# Patient Record
Sex: MALE | Race: WHITE | NOT HISPANIC OR LATINO | Employment: OTHER | URBAN - METROPOLITAN AREA
[De-identification: names, ages, dates, MRNs, and addresses within clinical notes are randomized per-mention and may not be internally consistent; named-entity substitution may affect disease eponyms.]

---

## 2017-01-13 ENCOUNTER — OFFICE VISIT - HEALTHEAST (OUTPATIENT)
Dept: INTERNAL MEDICINE | Facility: CLINIC | Age: 57
End: 2017-01-13

## 2017-01-13 DIAGNOSIS — Z00.00 ROUTINE GENERAL MEDICAL EXAMINATION AT A HEALTH CARE FACILITY: ICD-10-CM

## 2017-01-13 DIAGNOSIS — N40.0 BPH (BENIGN PROSTATIC HYPERPLASIA): ICD-10-CM

## 2017-01-13 DIAGNOSIS — R53.83 FATIGUE: ICD-10-CM

## 2017-01-13 DIAGNOSIS — E78.00 HYPERCHOLESTEREMIA: ICD-10-CM

## 2017-01-13 LAB
CHOLEST SERPL-MCNC: 186 MG/DL
FASTING STATUS PATIENT QL REPORTED: YES
HDLC SERPL-MCNC: 31 MG/DL
LDLC SERPL CALC-MCNC: 139 MG/DL
PSA SERPL-MCNC: 3.6 NG/ML (ref 0–3.5)
TRIGL SERPL-MCNC: 79 MG/DL

## 2017-01-13 ASSESSMENT — MIFFLIN-ST. JEOR: SCORE: 1728.18

## 2017-01-16 ENCOUNTER — COMMUNICATION - HEALTHEAST (OUTPATIENT)
Dept: INTERNAL MEDICINE | Facility: CLINIC | Age: 57
End: 2017-01-16

## 2017-01-16 ENCOUNTER — AMBULATORY - HEALTHEAST (OUTPATIENT)
Dept: INTERNAL MEDICINE | Facility: CLINIC | Age: 57
End: 2017-01-16

## 2017-01-16 DIAGNOSIS — R93.89 ABNORMAL CHEST X-RAY: ICD-10-CM

## 2017-01-17 ENCOUNTER — COMMUNICATION - HEALTHEAST (OUTPATIENT)
Dept: ADMINISTRATIVE | Facility: CLINIC | Age: 57
End: 2017-01-17

## 2017-01-20 ENCOUNTER — COMMUNICATION - HEALTHEAST (OUTPATIENT)
Dept: INTERNAL MEDICINE | Facility: CLINIC | Age: 57
End: 2017-01-20

## 2017-01-20 ENCOUNTER — HOSPITAL ENCOUNTER (OUTPATIENT)
Dept: CT IMAGING | Facility: CLINIC | Age: 57
Discharge: HOME | End: 2017-01-20
Attending: INTERNAL MEDICINE

## 2017-01-20 DIAGNOSIS — R93.89 ABNORMAL CHEST X-RAY: ICD-10-CM

## 2018-01-18 ENCOUNTER — COMMUNICATION - HEALTHEAST (OUTPATIENT)
Dept: INTERNAL MEDICINE | Facility: CLINIC | Age: 58
End: 2018-01-18

## 2018-01-19 ENCOUNTER — OFFICE VISIT - HEALTHEAST (OUTPATIENT)
Dept: INTERNAL MEDICINE | Facility: CLINIC | Age: 58
End: 2018-01-19

## 2018-01-19 DIAGNOSIS — R53.83 FATIGUE: ICD-10-CM

## 2018-01-19 DIAGNOSIS — M25.50 ARTHRALGIA: ICD-10-CM

## 2018-01-19 DIAGNOSIS — E78.00 HYPERCHOLESTEREMIA: ICD-10-CM

## 2018-01-19 DIAGNOSIS — M19.90 OSTEOARTHRITIS: ICD-10-CM

## 2018-01-19 DIAGNOSIS — N40.0 BPH (BENIGN PROSTATIC HYPERPLASIA): ICD-10-CM

## 2018-01-19 DIAGNOSIS — Z00.00 ROUTINE GENERAL MEDICAL EXAMINATION AT A HEALTH CARE FACILITY: ICD-10-CM

## 2018-01-19 LAB
ALBUMIN SERPL-MCNC: 3.5 G/DL (ref 3.5–5)
ALBUMIN UR-MCNC: NEGATIVE MG/DL
ALP SERPL-CCNC: 83 U/L (ref 45–120)
ALT SERPL W P-5'-P-CCNC: 22 U/L (ref 0–45)
ANION GAP SERPL CALCULATED.3IONS-SCNC: 8 MMOL/L (ref 5–18)
APPEARANCE UR: CLEAR
AST SERPL W P-5'-P-CCNC: 20 U/L (ref 0–40)
BILIRUB SERPL-MCNC: 1 MG/DL (ref 0–1)
BILIRUB UR QL STRIP: NEGATIVE
BUN SERPL-MCNC: 12 MG/DL (ref 8–22)
CALCIUM SERPL-MCNC: 9.1 MG/DL (ref 8.5–10.5)
CHLORIDE BLD-SCNC: 105 MMOL/L (ref 98–107)
CHOLEST SERPL-MCNC: 200 MG/DL
CO2 SERPL-SCNC: 26 MMOL/L (ref 22–31)
COLOR UR AUTO: YELLOW
CREAT SERPL-MCNC: 0.87 MG/DL (ref 0.7–1.3)
ERYTHROCYTE [DISTWIDTH] IN BLOOD BY AUTOMATED COUNT: 12 % (ref 11–14.5)
ERYTHROCYTE [SEDIMENTATION RATE] IN BLOOD BY WESTERGREN METHOD: 2 MM/HR (ref 0–15)
FASTING STATUS PATIENT QL REPORTED: YES
GFR SERPL CREATININE-BSD FRML MDRD: >60 ML/MIN/1.73M2
GLUCOSE BLD-MCNC: 93 MG/DL (ref 70–125)
GLUCOSE UR STRIP-MCNC: NEGATIVE MG/DL
HCT VFR BLD AUTO: 45.6 % (ref 40–54)
HCV AB SERPL QL IA: NEGATIVE
HDLC SERPL-MCNC: 36 MG/DL
HGB BLD-MCNC: 15.3 G/DL (ref 14–18)
HGB UR QL STRIP: NEGATIVE
KETONES UR STRIP-MCNC: NEGATIVE MG/DL
LDLC SERPL CALC-MCNC: 147 MG/DL
LEUKOCYTE ESTERASE UR QL STRIP: NEGATIVE
MCH RBC QN AUTO: 29.4 PG (ref 27–34)
MCHC RBC AUTO-ENTMCNC: 33.5 G/DL (ref 32–36)
MCV RBC AUTO: 88 FL (ref 80–100)
NITRATE UR QL: NEGATIVE
PH UR STRIP: 6 [PH] (ref 5–8)
PLATELET # BLD AUTO: 197 THOU/UL (ref 140–440)
PMV BLD AUTO: 7.6 FL (ref 7–10)
POTASSIUM BLD-SCNC: 4 MMOL/L (ref 3.5–5)
PROT SERPL-MCNC: 6.9 G/DL (ref 6–8)
PSA SERPL-MCNC: 3 NG/ML (ref 0–3.5)
RBC # BLD AUTO: 5.19 MILL/UL (ref 4.4–6.2)
SODIUM SERPL-SCNC: 139 MMOL/L (ref 136–145)
SP GR UR STRIP: <=1.005 (ref 1–1.03)
TRIGL SERPL-MCNC: 86 MG/DL
UROBILINOGEN UR STRIP-ACNC: NORMAL
WBC: 5.4 THOU/UL (ref 4–11)

## 2018-01-19 ASSESSMENT — MIFFLIN-ST. JEOR: SCORE: 1745.19

## 2018-01-22 ENCOUNTER — AMBULATORY - HEALTHEAST (OUTPATIENT)
Dept: INTERNAL MEDICINE | Facility: CLINIC | Age: 58
End: 2018-01-22

## 2018-01-22 DIAGNOSIS — R53.83 FATIGUE: ICD-10-CM

## 2018-01-22 DIAGNOSIS — E78.00 HYPERCHOLESTEREMIA: ICD-10-CM

## 2018-01-22 LAB — B BURGDOR IGG+IGM SER QL: 0.55 INDEX VALUE

## 2018-01-31 ENCOUNTER — COMMUNICATION - HEALTHEAST (OUTPATIENT)
Dept: INTERNAL MEDICINE | Facility: CLINIC | Age: 58
End: 2018-01-31

## 2018-03-02 ENCOUNTER — COMMUNICATION - HEALTHEAST (OUTPATIENT)
Dept: INTERNAL MEDICINE | Facility: CLINIC | Age: 58
End: 2018-03-02

## 2018-03-05 ENCOUNTER — OFFICE VISIT - HEALTHEAST (OUTPATIENT)
Dept: INTERNAL MEDICINE | Facility: CLINIC | Age: 58
End: 2018-03-05

## 2018-03-05 DIAGNOSIS — S60.552D: ICD-10-CM

## 2018-03-05 DIAGNOSIS — Z01.818 ENCOUNTER FOR PREOPERATIVE EXAMINATION FOR GENERAL SURGICAL PROCEDURE: ICD-10-CM

## 2018-03-05 LAB
ATRIAL RATE - MUSE: 62 BPM
DIASTOLIC BLOOD PRESSURE - MUSE: NORMAL MMHG
INTERPRETATION ECG - MUSE: NORMAL
P AXIS - MUSE: 44 DEGREES
PR INTERVAL - MUSE: 160 MS
QRS DURATION - MUSE: 100 MS
QT - MUSE: 424 MS
QTC - MUSE: 430 MS
R AXIS - MUSE: -14 DEGREES
SYSTOLIC BLOOD PRESSURE - MUSE: NORMAL MMHG
T AXIS - MUSE: 0 DEGREES
VENTRICULAR RATE- MUSE: 62 BPM

## 2018-03-05 ASSESSMENT — MIFFLIN-ST. JEOR: SCORE: 1772.41

## 2018-03-27 ENCOUNTER — RECORDS - HEALTHEAST (OUTPATIENT)
Dept: LAB | Facility: CLINIC | Age: 58
End: 2018-03-27

## 2018-03-28 ENCOUNTER — RECORDS - HEALTHEAST (OUTPATIENT)
Dept: ADMINISTRATIVE | Facility: OTHER | Age: 58
End: 2018-03-28

## 2018-03-28 LAB
LAB AP CHARGES (HE HISTORICAL CONVERSION): NORMAL
PATH REPORT.COMMENTS IMP SPEC: NORMAL
PATH REPORT.COMMENTS IMP SPEC: NORMAL
PATH REPORT.FINAL DX SPEC: NORMAL
PATH REPORT.GROSS SPEC: NORMAL
PATH REPORT.MICROSCOPIC SPEC OTHER STN: NORMAL
PATH REPORT.RELEVANT HX SPEC: NORMAL
RESULT FLAG (HE HISTORICAL CONVERSION): NORMAL

## 2018-03-30 LAB
BACTERIA SPEC CULT: NO GROWTH
BACTERIA SPEC CULT: NORMAL
GRAM STAIN RESULT: NORMAL
GRAM STAIN RESULT: NORMAL

## 2018-04-11 ENCOUNTER — RECORDS - HEALTHEAST (OUTPATIENT)
Dept: ADMINISTRATIVE | Facility: OTHER | Age: 58
End: 2018-04-11

## 2018-04-18 ENCOUNTER — RECORDS - HEALTHEAST (OUTPATIENT)
Dept: ADMINISTRATIVE | Facility: OTHER | Age: 58
End: 2018-04-18

## 2018-05-09 LAB — BACTERIA SPEC CULT: NORMAL

## 2018-05-14 ENCOUNTER — RECORDS - HEALTHEAST (OUTPATIENT)
Dept: ADMINISTRATIVE | Facility: OTHER | Age: 58
End: 2018-05-14

## 2019-01-25 ENCOUNTER — AMBULATORY - HEALTHEAST (OUTPATIENT)
Dept: INTERNAL MEDICINE | Facility: CLINIC | Age: 59
End: 2019-01-25

## 2019-01-25 ENCOUNTER — OFFICE VISIT - HEALTHEAST (OUTPATIENT)
Dept: INTERNAL MEDICINE | Facility: CLINIC | Age: 59
End: 2019-01-25

## 2019-01-25 DIAGNOSIS — Z00.00 ROUTINE GENERAL MEDICAL EXAMINATION AT A HEALTH CARE FACILITY: ICD-10-CM

## 2019-01-25 DIAGNOSIS — R35.1 BENIGN PROSTATIC HYPERPLASIA WITH NOCTURIA: ICD-10-CM

## 2019-01-25 DIAGNOSIS — M15.0 PRIMARY OSTEOARTHRITIS INVOLVING MULTIPLE JOINTS: ICD-10-CM

## 2019-01-25 DIAGNOSIS — Z12.11 SCREENING FOR COLON CANCER: ICD-10-CM

## 2019-01-25 DIAGNOSIS — E78.00 HYPERCHOLESTEREMIA: ICD-10-CM

## 2019-01-25 DIAGNOSIS — Z12.5 ENCOUNTER FOR PROSTATE CANCER SCREENING: ICD-10-CM

## 2019-01-25 DIAGNOSIS — Z86.0100 PERSONAL HISTORY OF COLONIC POLYPS: ICD-10-CM

## 2019-01-25 DIAGNOSIS — N40.1 BENIGN PROSTATIC HYPERPLASIA WITH NOCTURIA: ICD-10-CM

## 2019-01-25 DIAGNOSIS — S60.552D: ICD-10-CM

## 2019-01-25 DIAGNOSIS — Z23 NEED FOR IMMUNIZATION AGAINST INFLUENZA: ICD-10-CM

## 2019-01-25 LAB
ALBUMIN SERPL-MCNC: 3.8 G/DL (ref 3.5–5)
ALBUMIN UR-MCNC: NEGATIVE MG/DL
ALP SERPL-CCNC: 77 U/L (ref 45–120)
ALT SERPL W P-5'-P-CCNC: 27 U/L (ref 0–45)
ANION GAP SERPL CALCULATED.3IONS-SCNC: 10 MMOL/L (ref 5–18)
APPEARANCE UR: CLEAR
AST SERPL W P-5'-P-CCNC: 25 U/L (ref 0–40)
BILIRUB SERPL-MCNC: 0.7 MG/DL (ref 0–1)
BILIRUB UR QL STRIP: NEGATIVE
BUN SERPL-MCNC: 13 MG/DL (ref 8–22)
CALCIUM SERPL-MCNC: 9.1 MG/DL (ref 8.5–10.5)
CHLORIDE BLD-SCNC: 105 MMOL/L (ref 98–107)
CHOLEST SERPL-MCNC: 190 MG/DL
CO2 SERPL-SCNC: 24 MMOL/L (ref 22–31)
COLOR UR AUTO: NORMAL
CREAT SERPL-MCNC: 0.86 MG/DL (ref 0.7–1.3)
FASTING STATUS PATIENT QL REPORTED: ABNORMAL
GFR SERPL CREATININE-BSD FRML MDRD: >60 ML/MIN/1.73M2
GLUCOSE BLD-MCNC: 85 MG/DL (ref 70–125)
GLUCOSE UR STRIP-MCNC: NEGATIVE MG/DL
HDLC SERPL-MCNC: 35 MG/DL
HGB BLD-MCNC: 15.4 G/DL (ref 14–18)
HGB UR QL STRIP: NEGATIVE
KETONES UR STRIP-MCNC: NEGATIVE MG/DL
LDLC SERPL CALC-MCNC: 140 MG/DL
LEUKOCYTE ESTERASE UR QL STRIP: NEGATIVE
NITRATE UR QL: NEGATIVE
PH UR STRIP: 6 [PH] (ref 4.5–8)
POTASSIUM BLD-SCNC: 4.6 MMOL/L (ref 3.5–5)
PROT SERPL-MCNC: 6.4 G/DL (ref 6–8)
PSA SERPL-MCNC: 2.4 NG/ML (ref 0–4.5)
SODIUM SERPL-SCNC: 139 MMOL/L (ref 136–145)
SP GR UR STRIP: 1 (ref 1–1.03)
TRIGL SERPL-MCNC: 73 MG/DL
UROBILINOGEN UR STRIP-ACNC: NORMAL

## 2019-01-25 ASSESSMENT — MIFFLIN-ST. JEOR: SCORE: 1731.59

## 2019-02-05 ENCOUNTER — RECORDS - HEALTHEAST (OUTPATIENT)
Dept: ADMINISTRATIVE | Facility: OTHER | Age: 59
End: 2019-02-05

## 2019-02-08 ENCOUNTER — AMBULATORY - HEALTHEAST (OUTPATIENT)
Dept: INTERNAL MEDICINE | Facility: CLINIC | Age: 59
End: 2019-02-08

## 2019-02-08 DIAGNOSIS — Z51.81 MEDICATION MONITORING ENCOUNTER: ICD-10-CM

## 2019-02-08 DIAGNOSIS — E78.00 HYPERCHOLESTEREMIA: ICD-10-CM

## 2019-02-09 ENCOUNTER — AMBULATORY - HEALTHEAST (OUTPATIENT)
Dept: INTERNAL MEDICINE | Facility: CLINIC | Age: 59
End: 2019-02-09

## 2019-02-22 ENCOUNTER — COMMUNICATION - HEALTHEAST (OUTPATIENT)
Dept: INTERNAL MEDICINE | Facility: CLINIC | Age: 59
End: 2019-02-22

## 2019-02-26 ENCOUNTER — COMMUNICATION - HEALTHEAST (OUTPATIENT)
Dept: INTERNAL MEDICINE | Facility: CLINIC | Age: 59
End: 2019-02-26

## 2019-03-14 ENCOUNTER — AMBULATORY - HEALTHEAST (OUTPATIENT)
Dept: INTERNAL MEDICINE | Facility: CLINIC | Age: 59
End: 2019-03-14

## 2019-03-15 ENCOUNTER — RECORDS - HEALTHEAST (OUTPATIENT)
Dept: ADMINISTRATIVE | Facility: OTHER | Age: 59
End: 2019-03-15

## 2020-01-31 ENCOUNTER — OFFICE VISIT - HEALTHEAST (OUTPATIENT)
Dept: INTERNAL MEDICINE | Facility: CLINIC | Age: 60
End: 2020-01-31

## 2020-01-31 DIAGNOSIS — Z12.5 ENCOUNTER FOR PROSTATE CANCER SCREENING: ICD-10-CM

## 2020-01-31 DIAGNOSIS — R19.8 CHANGE IN BOWEL MOVEMENT: ICD-10-CM

## 2020-01-31 DIAGNOSIS — I71.20 THORACIC AORTIC ANEURYSM WITHOUT RUPTURE (H): ICD-10-CM

## 2020-01-31 DIAGNOSIS — Z00.00 ROUTINE GENERAL MEDICAL EXAMINATION AT A HEALTH CARE FACILITY: ICD-10-CM

## 2020-01-31 DIAGNOSIS — E78.00 HYPERCHOLESTEREMIA: ICD-10-CM

## 2020-01-31 DIAGNOSIS — Z86.0100 PERSONAL HISTORY OF COLONIC POLYPS: ICD-10-CM

## 2020-01-31 DIAGNOSIS — N40.1 BENIGN PROSTATIC HYPERPLASIA WITH LOWER URINARY TRACT SYMPTOMS, SYMPTOM DETAILS UNSPECIFIED: ICD-10-CM

## 2020-01-31 DIAGNOSIS — E55.9 VITAMIN D DEFICIENCY: ICD-10-CM

## 2020-01-31 LAB
ALBUMIN SERPL-MCNC: 3.9 G/DL (ref 3.5–5)
ALBUMIN UR-MCNC: NEGATIVE MG/DL
ALP SERPL-CCNC: 89 U/L (ref 45–120)
ALT SERPL W P-5'-P-CCNC: 38 U/L (ref 0–45)
ANION GAP SERPL CALCULATED.3IONS-SCNC: 9 MMOL/L (ref 5–18)
APPEARANCE UR: CLEAR
AST SERPL W P-5'-P-CCNC: 27 U/L (ref 0–40)
BILIRUB SERPL-MCNC: 0.7 MG/DL (ref 0–1)
BILIRUB UR QL STRIP: NEGATIVE
BUN SERPL-MCNC: 9 MG/DL (ref 8–22)
CALCIUM SERPL-MCNC: 8.9 MG/DL (ref 8.5–10.5)
CHLORIDE BLD-SCNC: 107 MMOL/L (ref 98–107)
CHOLEST SERPL-MCNC: 118 MG/DL
CO2 SERPL-SCNC: 24 MMOL/L (ref 22–31)
COLOR UR AUTO: YELLOW
CREAT SERPL-MCNC: 0.85 MG/DL (ref 0.7–1.3)
ERYTHROCYTE [DISTWIDTH] IN BLOOD BY AUTOMATED COUNT: 12.7 % (ref 11–14.5)
FASTING STATUS PATIENT QL REPORTED: YES
GFR SERPL CREATININE-BSD FRML MDRD: >60 ML/MIN/1.73M2
GLUCOSE BLD-MCNC: 97 MG/DL (ref 70–125)
GLUCOSE UR STRIP-MCNC: NEGATIVE MG/DL
HCT VFR BLD AUTO: 44.9 % (ref 40–54)
HDLC SERPL-MCNC: 33 MG/DL
HGB BLD-MCNC: 15.6 G/DL (ref 14–18)
HGB UR QL STRIP: NEGATIVE
KETONES UR STRIP-MCNC: NEGATIVE MG/DL
LDLC SERPL CALC-MCNC: 69 MG/DL
LEUKOCYTE ESTERASE UR QL STRIP: NEGATIVE
MCH RBC QN AUTO: 30.7 PG (ref 27–34)
MCHC RBC AUTO-ENTMCNC: 34.6 G/DL (ref 32–36)
MCV RBC AUTO: 89 FL (ref 80–100)
NITRATE UR QL: NEGATIVE
PH UR STRIP: 5.5 [PH] (ref 4.5–8)
PLATELET # BLD AUTO: 184 THOU/UL (ref 140–440)
PMV BLD AUTO: 7.8 FL (ref 7–10)
POTASSIUM BLD-SCNC: 5.2 MMOL/L (ref 3.5–5)
PROT SERPL-MCNC: 6.6 G/DL (ref 6–8)
PSA SERPL-MCNC: 2.2 NG/ML (ref 0–4.5)
RBC # BLD AUTO: 5.07 MILL/UL (ref 4.4–6.2)
SODIUM SERPL-SCNC: 140 MMOL/L (ref 136–145)
SP GR UR STRIP: 1.01 (ref 1–1.03)
TRIGL SERPL-MCNC: 80 MG/DL
UROBILINOGEN UR STRIP-ACNC: NORMAL
WBC: 4.7 THOU/UL (ref 4–11)

## 2020-01-31 ASSESSMENT — MIFFLIN-ST. JEOR: SCORE: 1713.44

## 2020-02-07 ENCOUNTER — HOSPITAL ENCOUNTER (OUTPATIENT)
Dept: CT IMAGING | Facility: CLINIC | Age: 60
Discharge: HOME | End: 2020-02-07
Attending: INTERNAL MEDICINE

## 2020-02-07 ENCOUNTER — COMMUNICATION - HEALTHEAST (OUTPATIENT)
Dept: INTERNAL MEDICINE | Facility: CLINIC | Age: 60
End: 2020-02-07

## 2020-02-07 DIAGNOSIS — I71.20 THORACIC AORTIC ANEURYSM WITHOUT RUPTURE (H): ICD-10-CM

## 2020-03-13 ENCOUNTER — COMMUNICATION - HEALTHEAST (OUTPATIENT)
Dept: INTERNAL MEDICINE | Facility: CLINIC | Age: 60
End: 2020-03-13

## 2020-03-13 DIAGNOSIS — E78.00 HYPERCHOLESTEREMIA: ICD-10-CM

## 2020-10-17 ENCOUNTER — RECORDS - HEALTHEAST (OUTPATIENT)
Dept: ADMINISTRATIVE | Facility: OTHER | Age: 60
End: 2020-10-17

## 2021-02-12 ENCOUNTER — OFFICE VISIT - HEALTHEAST (OUTPATIENT)
Dept: INTERNAL MEDICINE | Facility: CLINIC | Age: 61
End: 2021-02-12

## 2021-02-12 DIAGNOSIS — Z00.00 ROUTINE GENERAL MEDICAL EXAMINATION AT A HEALTH CARE FACILITY: ICD-10-CM

## 2021-02-12 DIAGNOSIS — E78.00 HYPERCHOLESTEREMIA: ICD-10-CM

## 2021-02-12 DIAGNOSIS — Z86.0100 PERSONAL HISTORY OF COLONIC POLYPS: ICD-10-CM

## 2021-02-12 DIAGNOSIS — I71.20 THORACIC AORTIC ANEURYSM WITHOUT RUPTURE (H): ICD-10-CM

## 2021-02-12 DIAGNOSIS — Z12.5 ENCOUNTER FOR PROSTATE CANCER SCREENING: ICD-10-CM

## 2021-02-12 DIAGNOSIS — N40.1 BENIGN PROSTATIC HYPERPLASIA WITH URINARY FREQUENCY: ICD-10-CM

## 2021-02-12 DIAGNOSIS — R35.0 BENIGN PROSTATIC HYPERPLASIA WITH URINARY FREQUENCY: ICD-10-CM

## 2021-02-12 LAB
ALBUMIN SERPL-MCNC: 3.9 G/DL (ref 3.5–5)
ALBUMIN UR-MCNC: NEGATIVE MG/DL
ALP SERPL-CCNC: 85 U/L (ref 45–120)
ALT SERPL W P-5'-P-CCNC: 35 U/L (ref 0–45)
ANION GAP SERPL CALCULATED.3IONS-SCNC: 7 MMOL/L (ref 5–18)
APPEARANCE UR: CLEAR
AST SERPL W P-5'-P-CCNC: 25 U/L (ref 0–40)
BILIRUB SERPL-MCNC: 1 MG/DL (ref 0–1)
BILIRUB UR QL STRIP: NEGATIVE
BUN SERPL-MCNC: 10 MG/DL (ref 8–22)
CALCIUM SERPL-MCNC: 8.6 MG/DL (ref 8.5–10.5)
CHLORIDE BLD-SCNC: 107 MMOL/L (ref 98–107)
CHOLEST SERPL-MCNC: 130 MG/DL
CO2 SERPL-SCNC: 26 MMOL/L (ref 22–31)
COLOR UR AUTO: YELLOW
CREAT SERPL-MCNC: 0.88 MG/DL (ref 0.7–1.3)
ERYTHROCYTE [DISTWIDTH] IN BLOOD BY AUTOMATED COUNT: 12.4 % (ref 11–14.5)
FASTING STATUS PATIENT QL REPORTED: YES
GFR SERPL CREATININE-BSD FRML MDRD: >60 ML/MIN/1.73M2
GLUCOSE BLD-MCNC: 101 MG/DL (ref 70–125)
GLUCOSE UR STRIP-MCNC: NEGATIVE MG/DL
HCT VFR BLD AUTO: 47.1 % (ref 40–54)
HDLC SERPL-MCNC: 35 MG/DL
HGB BLD-MCNC: 15.7 G/DL (ref 14–18)
HGB UR QL STRIP: NEGATIVE
KETONES UR STRIP-MCNC: NEGATIVE MG/DL
LDLC SERPL CALC-MCNC: 82 MG/DL
LEUKOCYTE ESTERASE UR QL STRIP: NEGATIVE
MCH RBC QN AUTO: 29.7 PG (ref 27–34)
MCHC RBC AUTO-ENTMCNC: 33.3 G/DL (ref 32–36)
MCV RBC AUTO: 89 FL (ref 80–100)
NITRATE UR QL: NEGATIVE
PH UR STRIP: 7 [PH] (ref 5–8)
PLATELET # BLD AUTO: 203 THOU/UL (ref 140–440)
PMV BLD AUTO: 9.2 FL (ref 7–10)
POTASSIUM BLD-SCNC: 4.7 MMOL/L (ref 3.5–5)
PROT SERPL-MCNC: 6.6 G/DL (ref 6–8)
PSA SERPL-MCNC: 2.7 NG/ML (ref 0–4.5)
RBC # BLD AUTO: 5.28 MILL/UL (ref 4.4–6.2)
SODIUM SERPL-SCNC: 140 MMOL/L (ref 136–145)
SP GR UR STRIP: 1.01 (ref 1–1.03)
TRIGL SERPL-MCNC: 64 MG/DL
UROBILINOGEN UR STRIP-ACNC: NORMAL
WBC: 4.9 THOU/UL (ref 4–11)

## 2021-02-12 RX ORDER — ROSUVASTATIN CALCIUM 10 MG/1
TABLET, COATED ORAL
Qty: 90 TABLET | Refills: 3 | Status: SHIPPED | OUTPATIENT
Start: 2021-02-12 | End: 2022-03-10

## 2021-02-12 ASSESSMENT — MIFFLIN-ST. JEOR: SCORE: 1702.39

## 2021-04-09 ENCOUNTER — IMMUNIZATION (OUTPATIENT)
Dept: NURSING | Facility: CLINIC | Age: 61
End: 2021-04-09
Payer: COMMERCIAL

## 2021-04-09 PROCEDURE — 91300 PR COVID VAC PFIZER DIL RECON 30 MCG/0.3 ML IM: CPT

## 2021-04-09 PROCEDURE — 0001A PR COVID VAC PFIZER DIL RECON 30 MCG/0.3 ML IM: CPT

## 2021-04-30 ENCOUNTER — IMMUNIZATION (OUTPATIENT)
Dept: NURSING | Facility: CLINIC | Age: 61
End: 2021-04-30
Attending: INTERNAL MEDICINE
Payer: COMMERCIAL

## 2021-04-30 PROCEDURE — 91300 PR COVID VAC PFIZER DIL RECON 30 MCG/0.3 ML IM: CPT

## 2021-04-30 PROCEDURE — 0002A PR COVID VAC PFIZER DIL RECON 30 MCG/0.3 ML IM: CPT

## 2021-05-26 ENCOUNTER — RECORDS - HEALTHEAST (OUTPATIENT)
Dept: ADMINISTRATIVE | Facility: CLINIC | Age: 61
End: 2021-05-26

## 2021-05-30 VITALS — HEIGHT: 72 IN | BODY MASS INDEX: 26.82 KG/M2 | WEIGHT: 198 LBS

## 2021-05-31 VITALS — WEIGHT: 200 LBS | HEIGHT: 72 IN | BODY MASS INDEX: 27.09 KG/M2

## 2021-06-01 VITALS — HEIGHT: 72 IN | BODY MASS INDEX: 27.9 KG/M2 | WEIGHT: 206 LBS

## 2021-06-02 VITALS — WEIGHT: 197 LBS | HEIGHT: 72 IN | BODY MASS INDEX: 26.68 KG/M2

## 2021-06-04 VITALS
SYSTOLIC BLOOD PRESSURE: 110 MMHG | OXYGEN SATURATION: 97 % | HEIGHT: 72 IN | BODY MASS INDEX: 26.14 KG/M2 | WEIGHT: 193 LBS | HEART RATE: 61 BPM | DIASTOLIC BLOOD PRESSURE: 82 MMHG

## 2021-06-05 VITALS
SYSTOLIC BLOOD PRESSURE: 122 MMHG | HEART RATE: 56 BPM | WEIGHT: 190.56 LBS | BODY MASS INDEX: 25.81 KG/M2 | OXYGEN SATURATION: 98 % | HEIGHT: 72 IN | DIASTOLIC BLOOD PRESSURE: 81 MMHG

## 2021-06-05 NOTE — TELEPHONE ENCOUNTER
----- Message from Juvencio Carter MD sent at 2/7/2020  3:07 PM CST -----  Please call and tell him that the dilated aorta is unchanged from last year.  Still measuring 4.4 cm.

## 2021-06-05 NOTE — PATIENT INSTRUCTIONS - HE
Recommending shingles vaccine, Shingrix.  This can also be obtained at your pharmacy.    Colonoscopy will be due 2022    Try Florastor twice daily for 2 weeks for change in bowel habits    Schedule appointment with eye doctor every 1 to 2 years and dentist every 6 months

## 2021-06-08 NOTE — PROGRESS NOTES
Office Visit - Physical   Ge DEBORAH Kiran   56 y.o.  male    Date of visit: 1/13/2017  Physician: Juvencio Carter MD     Assessment and Plan   1. Routine general medical examination at a health care facility  Immunizations reviewed.  We'll provide flu shot.  He will be due for a colonoscopy at the end of this year.  He should have every 5 years with history of polyps.  Prostate exam normal and we'll check PSA for prostate cancer screening.  Former smoker quitting before age 28.  We discussed trying to get more regular exercise.  He does have a living will.  He will continue regular eye exams and does keep up with dental appointments.  We discussed using sunblock.  Family history reviewed.  - PSA (Prostatic-Specific Antigen), Annual Screen  - Urinalysis    2. Hypercholesteremia  He has had dyslipidemia with elevated LDL and low HDL.  Recheck today and again discussed the importance of regular exercise.  His diet is fairly reasonable although he does have a sweet tooth.  - Lipid Cascade    3. BPH (benign prostatic hyperplasia)  Ongoing symptoms of urgency and slower stream.  We discussed Flomax as an option but he is not interested in medication at this time.  He will continue with saw palmetto    4. Fatigue  He feels exhausted at the end of the day.  Will complete metabolic evaluation including screening for B12 deficiency and looking for thyroid disease.  Given his distant history of tobacco use, will also obtain chest x-ray although minimal symptoms.  He describes some dyspnea when he bends over to tie his shoe.  We also discussed the possibility of sleep apnea.  He may not be getting adequate restful sleep at night and does start his morning at 5 AM.  His wife who is a respiratory therapist will monitor for any worrisome symptoms.  Consider referral to sleep clinic.  - Vitamin B12  - XR Chest PA and Lateral  - Comprehensive Metabolic Panel  - Thyroid Stimulating Hormone (TSH)  - HM2(CBC w/o  Differential)    Return in about 1 year (around 1/13/2018) for Annual physical.     Chief Complaint   Chief Complaint   Patient presents with     Annual Exam        Patient Profile   Social History     Social History Narrative    Construction     , 2 Children        Past Medical History   Patient Active Problem List   Diagnosis     Allergic rhinitis     Hypercholesteremia     Personal history of colonic polyps     BPH (benign prostatic hyperplasia)     Osteoarthritis     Tendinitis, de Quervain's     Cervical radiculopathy     Fatigue       Past Surgical History  He has a past surgical history that includes Appendectomy; Tonsillectomy; and Ankle fracture surgery.     History of Present Illness   This 56 y.o. old gentleman is here for an annual physical.  He has ongoing BPH symptoms.  Some urgency during the day with slower stream.  He is using saw palmetto.  He tried Avodart for 1 year without much benefit.  He is not too interested in taking any prescription medicine.  He is having ongoing fatigue especially at the end of the day.  He gets home from work and feels exhausted.  He does awaken every morning at 5 AM.  No problems during working daytime hours.  No falling asleep during the day on work days but can nap on the weekend.  No history of sleep apnea but he is a snorer.  Symptoms have been persistent for over one year.  No exertional chest pain or dyspnea and symptoms do not bother him with exertion.     Review of Systems: A comprehensive review of systems was negative except as noted.  General: Ongoing fatigue, no unexpected weight loss or weight gain, fevers, chills, or night sweats  Eyes:   Saw ophthalmology earlier this year  ENT: No ear or sinus infections.  No change in hearing.  No tinnitus.  Respiratory: No wheezing, dyspnea on exertion, or chronic cough  Cardiovascular: No chest pain, palpitations, dizziness, or syncope.  No peripheral edema.  GI: No abdominal pain, reflux symptoms, dysphagia,  "nausea, vomiting, constipation, or diarrhea  : No change in frequency or incontinence.  No hematuria.  No erectile dysfunction  Skin: No new rashes or lesions.  Neurologic: No headaches, seizures, dizziness, weakness, or numbness.  Musculoskeletal: No muscle or joint pain.  Lymphatic: No swollen lymph nodes  Psychiatric: No depression, anxiety, or sleep disorder.       Medications and Allergies   Current Outpatient Prescriptions   Medication Sig Dispense Refill     multivitamin (ONE A DAY) per tablet Take 1 tablet by mouth daily. 120 tablet 2     saw palmetto 160 mg cap 1 daily  0     No current facility-administered medications for this visit.      No Known Allergies     Family and Social History   Family History   Problem Relation Age of Onset     Diabetes type II Brother      Dementia Brother         Social History   Substance Use Topics     Smoking status: Never Smoker     Smokeless tobacco: None      Comment: quit 23yo     Alcohol use Yes      Comment: 3 beer/ week        Physical Exam   General Appearance:   Well-appearing middle-aged male    Visit Vitals     /80 (Patient Site: Right Arm, Patient Position: Sitting, Cuff Size: Adult Large)     Pulse 64     Ht 5' 11.5\" (1.816 m)     Wt 198 lb (89.8 kg)     SpO2 98%     BMI 27.23 kg/m2       EYES: Eyelids, conjunctiva, and sclera were normal. Pupils were normal. Cornea, iris, and lens were normal bilaterally.  HEAD, EARS, NOSE, MOUTH, AND THROAT: Head and face were normal. Nose appearance was normal and there was no discharge. Oropharynx was normal.  NECK: Neck appearance was normal. There were no neck masses and the thyroid was not enlarged and no nodules are felt.  No lymphadenopathy.  RESPIRATORY: Breathing pattern was normal and the chest moved symmetrically.  Percussion/auscultatory percussion was normal.  Lung sounds were normal and there were no rales or wheezes.  CARDIOVASCULAR: Heart rate and rhythm were normal.  S1 and S2 were normal and there " were no extra sounds or murmurs. Peripheral pulses in arms and legs were normal.  Jugular venous pressure was normal.  There was no peripheral edema.  No carotid bruits.  GASTROINTESTINAL: The abdomen was normal in contour.  Bowel sounds were present.  Percussion detected no organ enlargement or tenderness.  Palpation detected no tenderness, mass, or enlarged organs.   RECTAL/PROSTATE: No external lesions.  Sphincter tone normal.  No palpable rectal lesions.  Prostate normal size, smooth, nontender without palpable lesions.  MUSCULOSKELETAL: Skeletal configuration was normal and muscle mass was normal for age. Joint appearance was overall normal.  LYMPHATIC: There were no enlarged nodes.  SKIN/HAIR/NAILS: Skin color was normal.  There were no skin lesions.  Hair and nails were normal.  NEUROLOGIC: The patient was alert and oriented to person, place, time, and circumstance. Speech was normal. Cranial nerves were normal. Motor strength was normal for age. The patient was normally coordinated.  Sensation intact.  PSYCHIATRIC:  Mood and affect were normal and the patient had normal recent and remote memory. The patient's judgment and insight were normal.       Additional Information        Juvencio Carter MD  Internal Medicine  Contact me at [phone]

## 2021-06-15 NOTE — PROGRESS NOTES
Office Visit - Physical   Ge Kiran   57 y.o.  male    Date of visit: 1/19/2018  Physician: Juvencio Carter MD     Assessment and Plan   1. Routine general medical examination at a health care facility  Immunizations are reviewed and will provide flu shot today.  Living will discussed.  Non-smoker.  Uses alcohol in moderation.  Regular exercise discussed.  He is due for a colonoscopy and will help get this scheduled.  Prostate exam is normal and I will check a PSA for prostate cancer screening.   He sees his ophthalmologist regularly and gets glaucoma screening.  Skin exam performed and recommending regular use of sunblock.  Hepatitis C antibody for screening.  Will screen for diabetes with fasting glucose.     - Urinalysis  - PSA, Annual Screen (Prostatic-Specific Antigen)  - Hepatitis C Antibody (Anti-HCV)  - Ambulatory referral for Colonoscopy    2. Fatigue  Ongoing fatigue.  So for metabolic evaluation unremarkable but will check appropriate labs.  We will also screen for Lyme disease given associated joint aches.  Testosterone was normal 2 years ago.  B12 level normal.  No depression.  I suspect this is related to inadequate/poor sleep.  May have undiagnosed sleep apnea or periodic limb movement disorder.  Consider referral to sleep clinic.  His wife who is a respiratory therapist is in agreement.  - Comprehensive Metabolic Panel  - HM2(CBC w/o Differential)    3. Hypercholesteremia  Recheck lipid profile.  Cholesterol has been elevated  - Lipid Cascade    4. Osteoarthritis  Pain involving multiple joints with morning stiffness.    5. BPH (benign prostatic hyperplasia)  Symptoms are stable    6. Arthralgia  Suspect related to osteoarthritis but will check for chronic Lyme and will obtain sed rate  - Lyme Antibody Cascade  - Sedimentation Rate    Over 15 minutes was spent addressing these new and worsening medical problems beyond time spent performing annual physical with over 50% of the time spent  counseling and coordination of care    Return in about 1 year (around 1/19/2019) for Annual physical.     Chief Complaint   Chief Complaint   Patient presents with     Annual Exam     Pt is fasting        Patient Profile   Social History     Social History Narrative    Construction     , 2 Children        Past Medical History   Patient Active Problem List   Diagnosis     Allergic rhinitis     Hypercholesteremia     Personal history of colonic polyps     BPH (benign prostatic hyperplasia)     Osteoarthritis     Cervical radiculopathy     Fatigue     Arthralgia       Past Surgical History  He has a past surgical history that includes Appendectomy; Tonsillectomy; and Ankle fracture surgery.     History of Present Illness   This 57 y.o. old gentleman is here for his annual physical and to discuss other concerns.  Continues to experience fatigue.  Does not have a normal energy level that he is accustomed to having.  He can easily fall asleep on the couch after dinner.  Metabolic evaluation last year was normal as was CT scan of chest.  He has interrupted sleep.  He is a snorer.  There has been concerns for possible sleep apnea.  He is not getting much regular exercise.  Denies any exertional chest pain.  No unexpected weight loss.  He does have pain involving multiple joints.  He describes stiffness when he first gets up.  This quickly resolves after a few minutes.  No hot swollen joints.  No history of ECM rash but he has removed ticks from his body.  No night sweats or fevers or chills.  Injury to left hand now with large cyst.  He will need resection in the next month.  He has met with orthopedic hand surgeon.  We also discussed BPH symptoms.  Mostly stable.  No longer using saw palmetto.    Review of Systems: A comprehensive review of systems was negative except as noted.  General: There is fatigue, no unexpected weight loss or weight gain, fevers, chills, or night sweats  Eyes: No significant change in  vision.  Seeing ophthalmologist regularly.  ENT: No ear or sinus infections.  No change in hearing.  No tinnitus.  Respiratory: No wheezing, dyspnea on exertion, or chronic cough  Cardiovascular: No chest pain, palpitations, dizziness, or syncope.  No peripheral edema.  GI: No abdominal pain, occasional reflux depending on diet, no dysphagia, nausea, vomiting, constipation, or diarrhea  : No change in frequency or incontinence.  No hematuria.  No erectile dysfunction  Skin: No new rashes or lesions.  Neurologic: No headaches, seizures, dizziness, weakness, or numbness.  Musculoskeletal: Arthralgia  Lymphatic: No swollen lymph nodes  Psychiatric: No depression, anxiety     Medications and Allergies   No current outpatient prescriptions on file.     No current facility-administered medications for this visit.      No Known Allergies     Family and Social History   Family History   Problem Relation Age of Onset     Dementia Mother      Breast cancer Mother      Diabetes type II Brother      Dementia Brother         Social History   Substance Use Topics     Smoking status: Never Smoker     Smokeless tobacco: Never Used      Comment: quit 25yo     Alcohol use Yes      Comment: 3 beer/ week        Physical Exam   General Appearance:   Well-appearing middle-aged male    /82  Pulse 60  Ht 6' (1.829 m)  Wt 200 lb (90.7 kg)  BMI 27.12 kg/m2    EYES: Eyelids, conjunctiva, and sclera were normal. Pupils were normal. Cornea, iris, and lens were normal bilaterally.  HEAD, EARS, NOSE, MOUTH, AND THROAT: Head and face were normal. Nose appearance was normal and there was no discharge. Oropharynx was normal.  NECK: Neck appearance was normal. There were no neck masses and the thyroid was not enlarged and no nodules are felt.  No lymphadenopathy.  RESPIRATORY: Breathing pattern was normal and the chest moved symmetrically.  Percussion/auscultatory percussion was normal.  Lung sounds were normal and there were no rales or  wheezes.  CARDIOVASCULAR: Heart rate and rhythm were normal.  S1 and S2 were normal and there were no extra sounds or murmurs. Peripheral pulses in arms and legs were normal.  Jugular venous pressure was normal.  There was no peripheral edema.  No carotid bruits.  GASTROINTESTINAL: The abdomen was normal in contour.  Bowel sounds were present.  Percussion detected no organ enlargement or tenderness.  Palpation detected no tenderness, mass, or enlarged organs.   RECTAL/PROSTATE: No external lesions.  Sphincter tone normal.  No palpable rectal lesions.  Prostate moderately enlarged but smooth, nontender and no nodules.  MUSCULOSKELETAL: Skeletal configuration was normal and muscle mass was normal for age. Joint appearance was overall normal.  LYMPHATIC: There were no enlarged nodes.  SKIN/HAIR/NAILS: Skin color was normal.  There were no skin lesions.  Hair and nails were normal.  NEUROLOGIC: The patient was alert and oriented to person, place, time, and circumstance. Speech was normal. Cranial nerves were normal. Motor strength was normal for age. The patient was normally coordinated.  Sensation intact.  PSYCHIATRIC:  Mood and affect were normal and the patient had normal recent and remote memory. The patient's judgment and insight were normal.       Additional Information        Juvencio Carter MD  Internal Medicine  Contact me at [phone]

## 2021-06-15 NOTE — PATIENT INSTRUCTIONS - HE
Continue annual flu shots    Recommending shingles vaccine, Shingrix.  This can be obtained at your pharmacy as well    Colonoscopy will be due in 2022    Recommending an annual eye exam with your optometrist or ophthalmologist    Consider getting a total body skin exam with a dermatologist every 1 to 2 years    Repeat CTA chest for follow-up of dilated thoracic aorta in 2022    Let me know if your urinary symptoms worsen as you may benefit from starting Flomax (tamsulosin)

## 2021-06-16 NOTE — PROGRESS NOTES
Preoperative Exam    Scheduled Procedure: Left hand surgery, possible foreign body   Surgery Date:  3/27/18  Surgery Location: [facility] Fax     Surgeon:  Dr. Collins    Assessment/Plan:     1. Encounter for preoperative examination for general surgical procedure  Surgery planned for left hand with possible foreign body present and excision of large mass.  Overall risk is low.  No contraindications to proceeding with surgery and anesthesia.  Please see details below.  - Electrocardiogram Perform and Read          Surgical Procedure Risk: Intermediate (reported cardiac risk generally 1-5%)  Have you had prior anesthesia?: Yes  Have you or any family members had a previous anesthesia reaction:  No  Do you or any family members have a history of a clotting or bleeding disorder?: No  Cardiac Risk Assessment: no increased risk for major cardiac complications    Patient approved for surgery with general or local anesthesia.        Functional Status: Independent  Patient plans to recover at home with family.     Subjective:      Ge Kiran is a 57 y.o. male who presents for a preoperative consultation.  Large splinter in left hand last summer.  Thought it was completely removed but concern for persistent foreign body.  Has developed large cyst like mass in the thenar region between thumb and second finger.  Mildly tender and also having some numbness on the lateral aspect of the second finger.  He has had no problems with general anesthesia.  Health is good.  No exertional chest pain.  No history of coronary artery disease or congestive heart failure.  No problems with general anesthesia.  No history of DVT or pulmonary embolus.    All other systems reviewed and are negative, other than those listed in the HPI.    Pertinent History  Do you have difficulty breathing or chest pain after walking up a flight of stairs: No  History of obstructive sleep apnea: No  Steroid use in the last 6 months:  no  Frequent Aspirin/NSAID use: No  Prior Blood Transfusion: No  Prior Blood Transfusion Reaction: No  If for some reason prior to, during or after the procedure, if it is medically indicated, would you be willing to have a blood transfusion?:  There is no transfusion refusal.    No current outpatient prescriptions on file.     No current facility-administered medications for this visit.         No Known Allergies    Patient Active Problem List   Diagnosis     Allergic rhinitis     Hypercholesteremia     Personal history of colonic polyps     BPH (benign prostatic hyperplasia)     Osteoarthritis     Cervical radiculopathy     Fatigue     Arthralgia     Foreign body in hand, left, subsequent encounter       Past Medical History:   Diagnosis Date     Abnormal chest x-ray 01/16/2017    Normal CT scan     Allergic rhinitis      Arthralgia 1/19/2018     BPH (benign prostatic hyperplasia)      Cervical radiculopathy 2003    Left C5-C6 foraminal stenosis with bulging disc and osteophyte complex     Chest pain 2006    Normal GXT     Fatigue 11/13/2015     Foreign body in hand, left, subsequent encounter 3/5/2018     Hypercholesteremia      Osteoarthritis      Personal history of colonic polyps     Colonoscopy November 2012 with polyp     Tendinitis, de Quervain's 2013    Right wrist pain       Past Surgical History:   Procedure Laterality Date     ANKLE FRACTURE SURGERY       APPENDECTOMY       TONSILLECTOMY         Social History     Social History     Marital status:      Spouse name: N/A     Number of children: N/A     Years of education: N/A     Occupational History     Not on file.     Social History Main Topics     Smoking status: Never Smoker     Smokeless tobacco: Never Used      Comment: quit 23yo     Alcohol use Yes      Comment: 3 beer/ week     Drug use: Not on file     Sexual activity: Not on file     Other Topics Concern     Not on file     Social History Narrative    Construction     , 2  Children             Objective:     Vitals:    03/05/18 1358   BP: 114/80   Pulse: 70   SpO2: 98%   Weight: 206 lb (93.4 kg)   Height: 6' (1.829 m)         Physical Exam:  HEENT normal  RESPIRATORY: Breathing pattern was normal and the chest moved symmetrically.   Lung sounds were normal and there were no rales or wheezes.  CARDIOVASCULAR: Heart rate and rhythm were normal.  S1 and S2 were normal and there were no extra sounds or murmurs. Peripheral pulses in arms and legs were normal.  Jugular venous pressure was normal.  There was no peripheral edema.  No carotid bruits.  GASTROINTESTINAL: The abdomen was normal in contour.  Bowel sounds were present.   Palpation detected no tenderness, mass, or enlarged organs.   SKIN/HAIR/NAILS: No cyanosis or pallor  NEUROLOGIC: Grossly nonfocal  PSYCHIATRIC:  Mood and affect were normal     Patient Instructions     Hold all supplements, aspirin and NSAIDs for 7 days prior to surgery.  Follow your surgeon's direction on when to stop eating and drinking prior to surgery.      EKG: Normal sinus rhythm without significant ST or T-wave abnormality        Immunization History   Administered Date(s) Administered     Influenza, seasonal,quad inj 36+ mos 11/13/2015     Influenza,seasonal quad, PF, 36+MOS 01/13/2017, 01/19/2018     Tdap 10/01/2012           Electronically signed by Juvencio Carter MD 03/05/18 1:54 PM

## 2021-06-18 NOTE — LETTER
Letter by Juvencio Carter MD at      Author: Juvencio Carter MD Service: -- Author Type: --    Filed:  Encounter Date: 2/9/2019 Status: (Other)             February 8, 2019      Dear Slava,    I received your CT coronary calcium score which is attached.  Your score is 102 which places you in the 50th to 75th percentile compared to other men your age.  This suggests that you have at least a moderate amount of plaque building up in your coronary arteries.  You are at higher risk for future cardiovascular complications including heart attack and stroke.     In addition, your calculated 10-year estimated risk for future cardiovascular events is 8.7%.    As a result, I would strongly urge you to begin taking a statin.  This should significantly lower your risk.  I have included a prescription for rosuvastatin otherwise known as Crestor at a dose of 10 mg daily.  This is usually well-tolerated although some patients can experience muscle pain while on a statin.  Should you develop any such symptoms, contact me immediately.  I would recommend rechecking your lipid panel and liver studies in 4 months after beginning the medication.  Please call my clinic and schedule a lab appointment at that time.    Additionally and incidentally found is a mildly dilated thoracic aorta measuring 4.4 cm.  Normal is up to 3 cm.  This will need to be monitored.  I would recommend repeating a CT in 1 year to confirm that it has not changed.  This becomes concerning when it is over 5 cm.     If you have any questions, do not hesitate to send me a message on My Chart.      Sincerely,      Juvencio Carter MD  Internal Medicine  [facility]

## 2021-06-18 NOTE — LETTER
Letter by Juvencio Carter MD at      Author: Juvencio Carter MD Service: -- Author Type: --    Filed:  Encounter Date: 2/8/2019 Status: (Other)         February 8, 2019      Dear Slava,    I received your CT coronary calcium score which is attached.  Your score is 102 which places you in the 50th to 75th percentile compared to other men your age.  This suggests that you have at least a moderate amount of plaque building up in your coronary arteries.  You are at higher risk for future cardiovascular complications including heart attack and stroke.     In addition, your calculated 10-year estimated risk for future cardiovascular events is 8.7%.    As a result, I would strongly urge you to begin taking a statin.  This should significantly lower your risk.  I have included a prescription for rosuvastatin otherwise known as Crestor at a dose of 10 mg daily.  This is usually well-tolerated although some patients can experience muscle pain while on a statin.  Should you develop any such symptoms, contact me immediately.  I would recommend rechecking your lipid panel and liver studies in 4 months after beginning the medication.  Please call my clinic and schedule a lab appointment at that time.    If you have any questions, do not hesitate to send me a message on My Chart.      Sincerely,      Juvencio Carter MD  Internal Medicine  [facility]

## 2021-06-28 NOTE — PROGRESS NOTES
Progress Notes by Juvencio Carter MD at 1/31/2020  8:00 AM     Author: Juvencio Carter MD Service: -- Author Type: Physician    Filed: 1/31/2020  9:26 AM Encounter Date: 1/31/2020 Status: Signed    : Juvencio Carter MD (Physician)       MALE PREVENTATIVE EXAM    Assessment and Plan:       1. Routine general medical examination at a health care facility  Immunizations are reviewed and recommending Shingrix.  Living will has been discussed.  Non-smoker.  Uses alcohol in moderation.  Regular exercise discussed.  Up to date with colonoscopies and this should be repeated in 2022.  Prostate exam is normal and I will check a PSA for prostate cancer screening.  Recommending eye exam every 1 to 2 years.  He should schedule a dentist appointment as well.  Skin exam performed and recommending regular use of sunblock.  Hepatitis C antibody for screening was normal.  Will screen for diabetes with fasting glucose.      - Comprehensive Metabolic Panel  - HM2(CBC w/o Differential)  - Urinalysis-UC if Indicated    2. Hypercholesteremia  Elevated CT coronary calcium score of 102 placing him in the 50th to the 75th percentile.  Started rosuvastatin and tolerating 10 mg daily dose.  Recheck lipid profile and LFTs.  We discussed the pros and cons of taking aspirin and he will continue.  Consider repeating calcium score after 5 years.  - aspirin 81 MG EC tablet; Take 1 tablet (81 mg total) by mouth daily.  Dispense: 150 tablet; Refill: 2  - Lipid Cascade    3. Thoracic aortic aneurysm without rupture (H)  Incidentally found to have 4.4 cm dilated thoracic aorta last year.  Asymptomatic.  Will check CTA chest to confirm stability.  Continue to monitor every 1 to 2 years depending on if any change.  - CTA Chest; Future    4. Personal history of colonic polyps  Multiple polyps including advanced adenoma removed in early 2019.  He will need to return after 3 years.    5. Benign prostatic hyperplasia with lower urinary  "tract symptoms, symptom details unspecified  BPH symptoms are stable    6. Change in bowel movement  He has developed a change in his bowel habits over the last month.  Stools are now loose.  No blood.  Some \"gurgling\" sensation in his stomach but no abdominal pain or cramping.  No unexpected weight loss.  He does not use NSAIDs on a regular basis.  No recent antibiotic use.  He has changed his diet considerably over the last year eating more fruit.  He does not restrict dairy but this is nothing new.  He has tried to cut back on sugar intake and feels generally better.    7. Vitamin D deficiency  He takes a vitamin D supplement daily  - cholecalciferol, vitamin D3, (VITAMIN D3) 2,000 unit Tab; 1 tab daily; Refill: 0    8. Encounter for prostate cancer screening    - PSA (Prostatic-Specific Antigen), Annual Screen    Over 15 minutes was spent addressing these new medical problems beyond time spent performing annual physical with over 50% of the time spent counseling and coordination of care    Next follow up:  Return in 1 year (on 1/31/2021) for Annual physical.    Immunization Review  Adult Imm Review: Recommending Shingrix      I discussed the following with the patient:   Adult Healthy Living: Importance of regular exercise  Healthy nutrition        Subjective:   Chief Complaint: Ge Kiran is an 59 y.o. male here for a preventative health visit.     HPI: In addition to annual physical, discussed several new issues.    CT coronary calcium score was elevated last year at 102 placing him in the 50th to the 75th percentile.  Started on rosuvastatin for hypercholesterolemia and tolerating without side effects.  Denies any exertional chest pain.    Incidentally found to have 4.4 cm dilated thoracic aorta.  Asymptomatic without chest pain.  We discussed appropriate follow-up.    Over the last month, he has developed a change in his bowel movements where stools are now loose.  Normally quite regular.  Denies any " blood in his stools.  Some gurgling sensation in his abdomen but no cramping or pain.  Last colonoscopy March 2019 with multiple polyps including advanced adenoma removed.  He does not use NSAIDs regularly.  No recent antibiotic use.  He has changed his diet significantly over the last year cutting back on sugar.  However, he is also eating more fruit than usual including grapes on a daily basis.  He does not restrict his dairy enjoying ice cream several times per week but this is nothing new.  No unexpected weight loss.  No fevers or chills.  He has tried some occasional probiotic without much difference.    Healthy Habits  Are you taking a daily aspirin? Yes  Do you typically exercising at least 40 min, 3-4 times per week?  NO  Do you usually eat at least 4 servings of fruit and vegetables a day, include whole grains and fiber and avoid regularly eating high fat foods? Yes  Have you had an eye exam in the past two years? NO  Do you see a dentist twice per year? NO  Do you have any concerns regarding sleep? No    Safety Screen    Do you feel you are safe where you are living?: Yes (1/31/2020  7:44 AM)  Do you feel you are safe in your relationship(s)?: Yes (1/31/2020  7:44 AM)      Review of Systems:  Please see above.  The rest of the review of systems are negative for all systems.     Cancer Screening       Status Date      COLONOSCOPY Next Due 3/15/2029      Done 3/15/2019 COLONOSCOPY EXTERNAL RESULT     Patient has more history with this topic...              History     Reviewed By Date/Time Sections Reviewed    Juvencio Carter MD 1/31/2020  8:04 AM Medical, Surgical, Tobacco, Alcohol, Drug Use, Sexual Activity, Family, Social Documentation    Nichole Addison [facility] 1/31/2020  7:47 AM Tobacco            Objective:   Vital Signs:   Visit Vitals  /82 (Patient Site: Left Arm)   Pulse 61   Ht 6' (1.829 m)   Wt 193 lb (87.5 kg)   SpO2 97%   BMI 26.18 kg/m           PHYSICAL EXAM  EYES: Eyelids,  conjunctiva, and sclera were normal. Pupils were normal. Cornea, iris, and lens were normal bilaterally.  HEAD, EARS, NOSE, MOUTH, AND THROAT: Head and face were normal. Nose appearance was normal and there was no discharge. Oropharynx was normal.  NECK: Neck appearance was normal. There were no neck masses and the thyroid was not enlarged and no nodules are felt.  No lymphadenopathy.  RESPIRATORY: Breathing pattern was normal and the chest moved symmetrically.  Percussion/auscultatory percussion was normal.  Lung sounds were normal and there were no rales or wheezes.  CARDIOVASCULAR: Heart rate and rhythm were normal.  S1 and S2 were normal and there were no extra sounds or murmurs. Peripheral pulses in arms and legs were normal.  Jugular venous pressure was normal.  There was no peripheral edema.  No carotid bruits.  GASTROINTESTINAL: The abdomen was normal in contour.  Bowel sounds were present.   Palpation detected no tenderness, mass, or enlarged organs.   RECTAL/PROSTATE: No external lesions.  Sphincter tone normal.  No palpable rectal lesions.  Prostate moderately enlarged but smooth, nontender without nodules  MUSCULOSKELETAL: Skeletal configuration was normal and muscle mass was normal for age. Joint appearance was overall normal.  LYMPHATIC: There were no enlarged nodes.  SKIN/HAIR/NAILS: Skin color was normal.  Hair and nails were normal.There were no skin lesions.  NEUROLOGIC: The patient was alert and oriented to person, place, time, and circumstance. Speech was normal. Cranial nerves were normal. Motor strength was normal for age. The patient was normally coordinated.  Sensation intact.  PSYCHIATRIC:  Mood and affect were normal and the patient had normal recent and remote memory. The patient's judgment and insight were normal.             Medication List          Accurate as of January 31, 2020  9:26 AM. If you have any questions, ask your nurse or doctor.            START taking these medications     aspirin 81 MG EC tablet  INSTRUCTIONS:  Take 1 tablet (81 mg total) by mouth daily.  Started by:  Juvencio Carter MD        cholecalciferol (vitamin D3) 2,000 unit Tab  Also known as:  Vitamin D3  INSTRUCTIONS:  1 tab daily  Started by:  Juvencio Carter MD           CONTINUE taking these medications    rosuvastatin 10 MG tablet  Also known as:  Crestor  INSTRUCTIONS:  Take 1 tablet (10 mg total) by mouth at bedtime.              Where to Get Your Medications      You can get these medications from any pharmacy    You don't need a prescription for these medications    aspirin 81 MG EC tablet     Information about where to get these medications is not yet available    Ask your nurse or doctor about these medications    cholecalciferol (vitamin D3) 2,000 unit Tab         Additional Screenings Completed Today:

## 2021-06-30 NOTE — PROGRESS NOTES
Progress Notes by Juvencio Carter MD at 2/12/2021  9:00 AM     Author: Juvencio Carter MD Service: -- Author Type: Physician    Filed: 2/12/2021  9:05 AM Encounter Date: 2/12/2021 Status: Signed    : Juvencio Carter MD (Physician)       MALE PREVENTATIVE EXAM    Assessment and Plan:     Patient has been advised of split billing requirements and indicates understanding: Yes    1. Routine general medical examination at a health care facility  Immunizations are reviewed and providing flu shot.  Recommending Shingrix.  Living will has been discussed.  Non-smoker.  Uses alcohol in moderation.  Regular exercise discussed.  Up to date with colonoscopies and this should be repeated in 2022.  Prostate exam is normal and I will check a PSA for prostate cancer screening.   Recommending that he sees his ophthalmologist every year. Skin exam performed and recommending regular use of sunblock.  Recommending dermatology visit every 1 to 2 years.  Hepatitis C antibody for screening was normal.  Will screen for diabetes with fasting glucose.      - Urinalysis-UC if Indicated  - HM2(CBC w/o Differential)  - Comprehensive Metabolic Panel    2. Thoracic aortic aneurysm without rupture (H)  Family history of father dying from aneurysm.  He has known thoracic aortic aneurysm measured last year and stable at 4.4 cm.  Repeat CTA in 2022.  He is asymptomatic without any chest pain.  Good blood pressure control.    3. Hypercholesteremia  Elevated CT coronary calcium score of 102 placing him in the 50th-75th percentile.  Has been on rosuvastatin and is tolerating 10 mg daily without side effects.  He continues aspirin 81 mg daily as benefits outweigh risks.  Recheck lipid profile today.  - rosuvastatin (CRESTOR) 10 MG tablet; TAKE 1 TABLET(10 MG) BY MOUTH AT BEDTIME  Dispense: 90 tablet; Refill: 3  - Lipid Cascade    4. Benign prostatic hyperplasia with urinary frequency  Experiencing some increasing urinary frequency  and nocturia.  We discussed cutting back on caffeine intake.  He has tried saw palmetto without much benefit.  Avodart previously tried but did not find this helpful.  He may be a candidate for Flomax but he would like to work at diet modification further.  He has noticed that increasing fruits and vegetables in his diet and decreasing cheese seems to help.    5. Personal history of colonic polyps  Multiple adenomatous polyps including advanced adenoma removed in early 2019.  He will be due for another colonoscopy in 2022    6. Encounter for prostate cancer screening    - PSA (Prostatic-Specific Antigen), Annual Screen        Next follow up:  Return in 1 year (on 2/12/2022) for Annual physical.    Immunization Review  Adult Imm Review: Providing flu shot and recommending Shingrix      I discussed the following with the patient:   Adult Healthy Living: Importance of regular exercise  Healthy nutrition        Subjective:   Chief Complaint: Ge Kiran is an 60 y.o. male here for a preventative health visit.    Patient has been advised of split billing requirements and indicates understanding: Yes  HPI: In addition to his annual preventive visit, we discussed presence of thoracic aortic aneurysm, symptoms of urinary frequency and nocturia along with management of hypercholesterolemia.  See assessment and plan for details    Healthy Habits  Are you taking a daily aspirin? Yes  Do you typically exercising at least 40 min, 3-4 times per week?  Yes  Do you usually eat at least 4 servings of fruit and vegetables a day, include whole grains and fiber and avoid regularly eating high fat foods? Yes  Have you had an eye exam in the past two years? NO  Do you see a dentist twice per year? NO  Do you have any concerns regarding sleep? YES    Safety Screen  If you own firearms, are they secured in a locked gun cabinet or with trigger locks? Yes  Do you feel you are safe where you are living?: Yes (2/12/2021  7:53 AM)  Do you  feel you are safe in your relationship(s)?: Yes (2/12/2021  7:53 AM)      Review of Systems:  Please see above.  The rest of the review of systems are negative for all systems.     Cancer Screening     Patient has no health maintenance due at this time              History     Reviewed By Date/Time Sections Reviewed    Juvencio Carter MD 2/12/2021  8:22 AM Family            Objective:   Vital Signs:   Visit Vitals  /81   Pulse (!) 56   Ht 6' (1.829 m)   Wt 190 lb 9 oz (86.4 kg)   SpO2 98%   BMI 25.84 kg/m           PHYSICAL EXAM  EYES: Eyelids, conjunctiva, and sclera were normal. Pupils were normal. Cornea, iris, and lens were normal bilaterally.  HEAD, EARS, NOSE, MOUTH, AND THROAT: Head and face were normal. TMs and external auditory canals are normal  NECK: Neck appearance was normal. There were no neck masses and the thyroid was not enlarged and no nodules are felt.  No lymphadenopathy.  RESPIRATORY: Breathing pattern was normal and the chest moved symmetrically.   Lung sounds were normal and there were no rales or wheezes.  CARDIOVASCULAR: Heart rate and rhythm were normal.  S1 and S2 were normal and there were no extra sounds or murmurs. Peripheral pulses in arms and legs were normal.  Jugular venous pressure was normal.  There was no peripheral edema.  No carotid bruits.  GASTROINTESTINAL:  Bowel sounds were present.   Palpation detected no tenderness, mass, or enlarged organs.   RECTAL/PROSTATE: No external lesions.  Sphincter tone normal.  No palpable rectal lesions.  Prostate moderately enlarged but smooth, nontender without nodules  MUSCULOSKELETAL: Skeletal configuration was normal and muscle mass was normal for age. Joint appearance was overall normal.  LYMPHATIC: There were no enlarged nodes.  SKIN/HAIR/NAILS: Skin color was normal.  Hair and nails were normal.There were no skin lesions.  NEUROLOGIC: The patient was alert and oriented to person, place, time, and circumstance. Speech was  normal. Cranial nerves were normal. Motor strength was normal for age. The patient was normally coordinated.  Sensation intact.  PSYCHIATRIC:  Mood and affect were normal and the patient had normal recent and remote memory. The patient's judgment and insight were normal.             Medication List          Accurate as of February 12, 2021  9:04 AM. If you have any questions, ask your nurse or doctor.            CONTINUE taking these medications    aspirin 81 MG EC tablet  INSTRUCTIONS: Take 1 tablet (81 mg total) by mouth daily.        rosuvastatin 10 MG tablet  Also known as: CRESTOR  INSTRUCTIONS: TAKE 1 TABLET(10 MG) BY MOUTH AT BEDTIME           STOP taking these medications    cholecalciferol (vitamin D3) 50 mcg (2,000 unit) Tab  Also known as: Vitamin D3  Stopped by: Juvencio Carter MD     UNABLE TO FIND  Stopped by: Juvencio Carter MD           Where to Get Your Medications      These medications were sent to [facility] - [address] [address]    Phone: [phone]     rosuvastatin 10 MG tablet         Additional Screenings Completed Today:

## 2021-07-04 ENCOUNTER — HEALTH MAINTENANCE LETTER (OUTPATIENT)
Age: 61
End: 2021-07-04

## 2021-07-05 PROBLEM — H26.9 CATARACT OF BOTH EYES: Status: ACTIVE | Noted: 2021-06-24

## 2021-10-24 ENCOUNTER — HEALTH MAINTENANCE LETTER (OUTPATIENT)
Age: 61
End: 2021-10-24

## 2022-03-10 DIAGNOSIS — E78.00 HYPERCHOLESTEREMIA: ICD-10-CM

## 2022-03-10 RX ORDER — ROSUVASTATIN CALCIUM 10 MG/1
TABLET, COATED ORAL
Qty: 90 TABLET | Refills: 3 | Status: SHIPPED | OUTPATIENT
Start: 2022-03-10 | End: 2023-04-03

## 2022-03-10 NOTE — TELEPHONE ENCOUNTER
"Routing refill request to provider for review/approval because:  Labs not current:  LDL    Last Written Prescription Date:  2/12/21  Last Fill Quantity: 90,  # refills: 3   Last office visit provider:  6/24/21    Requested Prescriptions   Pending Prescriptions Disp Refills     rosuvastatin (CRESTOR) 10 MG tablet [Pharmacy Med Name: ROSUVASTATIN TABS 10MG] 90 tablet 3     Sig: TAKE 1 TABLET AT BEDTIME       Statins Protocol Failed - 3/10/2022  7:05 AM        Failed - LDL on file in past 12 months     Recent Labs   Lab Test 02/12/21  0903   LDL 82             Passed - No abnormal creatine kinase in past 12 months     No lab results found.             Passed - Recent (12 mo) or future (30 days) visit within the authorizing provider's specialty     Patient has had an office visit with the authorizing provider or a provider within the authorizing providers department within the previous 12 mos or has a future within next 30 days. See \"Patient Info\" tab in inbasket, or \"Choose Columns\" in Meds & Orders section of the refill encounter.              Passed - Medication is active on med list        Passed - Patient is age 18 or older             Frida Gibbs RN 03/10/22 12:55 PM  "

## 2022-03-21 NOTE — PROGRESS NOTES
MALE PREVENTATIVE EXAM    Assessment and Plan:       1. Routine general medical examination at a health care facility  Immunizations are reviewed and providing flu shot.  Recommending Shingrix.  Living will discussed.  Non-smoker.  Uses alcohol in moderation.  Regular exercise discussed.  Up to date with colonoscopies and this should be repeated this year.  Prostate exam is normal and I will check a PSA for prostate cancer screening.  He needs to schedule an eye exam.  Skin exam performed and recommending regular use of sunblock.  Recommending dermatology total body exam.  Hepatitis C antibody was normal.  Will screen for diabetes with fasting glucose.    - Comprehensive Metabolic Panel  - Urinalysis  - Hemoglobin  - Ambulatory referral to Dermatology    2. Hypercholesteremia  Cholesterol has been moderately elevated.  Calcification in coronary arteries noted incidentally on CT scan.  Recommending CT coronary calcium score.  10-year estimated risk for ASCVD is 8.7%.  He is reluctant to begin a statin but results of CT calcium score will be helpful.  - Lipid Cascade  - CT Cardiac Calcium Score; Future    3. Primary osteoarthritis involving multiple joints  Pain involving multiple joints stable    4. Personal history of colonic polyps  We will arrange for colonoscopy    5. Benign prostatic hyperplasia with nocturia  No change in nocturia symptoms    6. Foreign body in hand, left, subsequent encounter  Surgery completed last year    7. Encounter for prostate cancer screening    - PSA (Prostatic-Specific Antigen), Annual Screen    8. Screening for colon cancer    - Ambulatory referral for Colonoscopy        Next follow up:  Return in 1 year (on 1/25/2020) for Annual physical.    Immunization Review  Adult Imm Review: Providing flu shot.  Recommending Shingrix      I discussed the following with the patient:   Adult Healthy Living: Importance of regular exercise  Healthy nutrition    I have had an Advance Directives  discussion with the patient.    Subjective:   Chief Complaint: Ge Kiran is an 58 y.o. male here for a preventative health visit.     HPI: Still notices some fatigue but less bothersome active.  He has lost 9 pounds.  Now taking some vitamin D every day.  H symptoms of dysuria unchanged.  Arthritis symptoms stable.  We discussed elevated cholesterol.  No exertional chest pain.    Healthy Habits  Are you taking a daily aspirin? No  Do you typically exercising at least 40 min, 3-4 times per week?  NO  Do you usually eat at least 4 servings of fruit and vegetables a day, include whole grains and fiber and avoid regularly eating high fat foods? Yes  Have you had an eye exam in the past two years? Yes  Do you see a dentist twice per year? NO  Do you have any concerns regarding sleep? No    Safety Screen  If you own firearms, are they secured in a locked gun cabinet or with trigger locks? The patient does not own any firearms  Do you feel you are safe where you are living?: Yes (1/25/2019  7:50 AM)  Do you feel you are safe in your relationship(s)?: Yes (1/25/2019  7:50 AM)      Review of Systems:  Please see above.  The rest of the review of systems are negative for all systems.     Cancer Screening       Status Date      COLONOSCOPY Next Due 11/2/2022      Done 11/2/2012      Patient has more history with this topic...              History     Reviewed By Date/Time Sections Reviewed    Juvencio Carter MD 1/25/2019  8:02 AM Medical, Surgical, Tobacco, Alcohol, Drug Use, Sexual Activity, Family, Social Documentation    Callaghan, Juvencio L, MD 1/25/2019  7:52 AM Surgical, Tobacco, Alcohol, Drug Use, Sexual Activity, Family, Social Documentation    Callaghan, Juvencio L, MD 1/25/2019  7:50 AM Cassandra Wong CMA 1/25/2019  7:50 AM Tobacco, Alcohol, Drug Use, Sexual Activity            Objective:   Vital Signs:   Visit Vitals  /72 (Patient Site: Left Arm, Patient Position: Sitting, Cuff Size:  Adult Large)   Pulse (!) 58   Ht 6' (1.829 m)   Wt 197 lb (89.4 kg)   SpO2 98%   BMI 26.72 kg/m           PHYSICAL EXAM  EYES: Eyelids, conjunctiva, and sclera were normal. Pupils were normal. Cornea, iris, and lens were normal bilaterally.  HEAD, EARS, NOSE, MOUTH, AND THROAT: Head and face were normal. Nose appearance was normal and there was no discharge. Oropharynx was normal.  NECK: Neck appearance was normal. There were no neck masses and the thyroid was not enlarged and no nodules are felt.  No lymphadenopathy.  RESPIRATORY: Breathing pattern was normal and the chest moved symmetrically.  Percussion/auscultatory percussion was normal.  Lung sounds were normal and there were no rales or wheezes.  CARDIOVASCULAR: Heart rate and rhythm were normal.  S1 and S2 were normal and there were no extra sounds or murmurs. Peripheral pulses in arms and legs were normal.  Jugular venous pressure was normal.  There was no peripheral edema.  No carotid bruits.  GASTROINTESTINAL: The abdomen was normal in contour.  Bowel sounds were present.   Palpation detected no tenderness, mass, or enlarged organs.   RECTAL/PROSTATE: No external lesions.  Sphincter tone normal.  No palpable rectal lesions.  Prostate mildly enlarged but smooth, nontender without nodules  MUSCULOSKELETAL: Skeletal configuration was normal and muscle mass was normal for age. Joint appearance was overall normal.  LYMPHATIC: There were no enlarged nodes.  SKIN/HAIR/NAILS: Skin color was normal.  Hair and nails were normal.several moles appearing benign although raised lesion on scalp  NEUROLOGIC: The patient was alert and oriented to person, place, time, and circumstance. Speech was normal. Cranial nerves were normal. Motor strength was normal for age. The patient was normally coordinated.  Sensation intact.  PSYCHIATRIC:  Mood and affect were normal and the patient had normal recent and remote memory. The patient's judgment and insight were normal.    The  10-year ASCVD risk score (Abiel CERRATO Jr., et al., 2013) is: 8.7%    Values used to calculate the score:      Age: 58 years      Sex: Male      Is Non- : No      Diabetic: No      Tobacco smoker: No      Systolic Blood Pressure: 112 mmHg      Is BP treated: No      HDL Cholesterol: 36 mg/dL      Total Cholesterol: 200 mg/dL         Medication List      as of 1/25/2019  8:34 AM     You have not been prescribed any medications.         Additional Screenings Completed Today:      Bilateral Helical Rim Advancement Flap Text: The defect edges were debeveled with a #15 blade scalpel.  Given the location of the defect and the proximity to free margins (helical rim) a bilateral helical rim advancement flap was deemed most appropriate.  Using a sterile surgical marker, the appropriate advancement flaps were drawn incorporating the defect and placing the expected incisions between the helical rim and antihelix where possible.  The area thus outlined was incised through and through with a #15 scalpel blade.  With a skin hook and iris scissors, the flaps were gently and sharply undermined and freed up.

## 2022-04-10 ENCOUNTER — HEALTH MAINTENANCE LETTER (OUTPATIENT)
Age: 62
End: 2022-04-10

## 2022-04-18 ENCOUNTER — OFFICE VISIT (OUTPATIENT)
Dept: INTERNAL MEDICINE | Facility: CLINIC | Age: 62
End: 2022-04-18
Payer: COMMERCIAL

## 2022-04-18 VITALS
HEIGHT: 72 IN | HEART RATE: 66 BPM | DIASTOLIC BLOOD PRESSURE: 62 MMHG | OXYGEN SATURATION: 97 % | BODY MASS INDEX: 26.02 KG/M2 | WEIGHT: 192.1 LBS | SYSTOLIC BLOOD PRESSURE: 94 MMHG

## 2022-04-18 DIAGNOSIS — Z00.00 ROUTINE GENERAL MEDICAL EXAMINATION AT A HEALTH CARE FACILITY: Primary | ICD-10-CM

## 2022-04-18 DIAGNOSIS — Z86.0100 PERSONAL HISTORY OF COLONIC POLYPS: ICD-10-CM

## 2022-04-18 DIAGNOSIS — R35.0 BENIGN PROSTATIC HYPERPLASIA WITH URINARY FREQUENCY: ICD-10-CM

## 2022-04-18 DIAGNOSIS — I71.20 THORACIC AORTIC ANEURYSM WITHOUT RUPTURE (H): ICD-10-CM

## 2022-04-18 DIAGNOSIS — E78.00 HYPERCHOLESTEREMIA: ICD-10-CM

## 2022-04-18 DIAGNOSIS — N40.1 BENIGN PROSTATIC HYPERPLASIA WITH URINARY FREQUENCY: ICD-10-CM

## 2022-04-18 DIAGNOSIS — Z12.5 SCREENING FOR PROSTATE CANCER: ICD-10-CM

## 2022-04-18 PROBLEM — H26.9 CATARACT OF BOTH EYES: Status: RESOLVED | Noted: 2021-06-24 | Resolved: 2022-04-18

## 2022-04-18 LAB
ALBUMIN SERPL-MCNC: 3.9 G/DL (ref 3.5–5)
ALBUMIN UR-MCNC: NEGATIVE MG/DL
ALP SERPL-CCNC: 85 U/L (ref 45–120)
ALT SERPL W P-5'-P-CCNC: 25 U/L (ref 0–45)
ANION GAP SERPL CALCULATED.3IONS-SCNC: 10 MMOL/L (ref 5–18)
APPEARANCE UR: CLEAR
AST SERPL W P-5'-P-CCNC: 23 U/L (ref 0–40)
BACTERIA #/AREA URNS HPF: ABNORMAL /HPF
BILIRUB SERPL-MCNC: 0.9 MG/DL (ref 0–1)
BILIRUB UR QL STRIP: NEGATIVE
BUN SERPL-MCNC: 12 MG/DL (ref 8–22)
CALCIUM SERPL-MCNC: 8.9 MG/DL (ref 8.5–10.5)
CHLORIDE BLD-SCNC: 106 MMOL/L (ref 98–107)
CHOLEST SERPL-MCNC: 142 MG/DL
CO2 SERPL-SCNC: 23 MMOL/L (ref 22–31)
COLOR UR AUTO: YELLOW
CREAT SERPL-MCNC: 0.82 MG/DL (ref 0.7–1.3)
ERYTHROCYTE [DISTWIDTH] IN BLOOD BY AUTOMATED COUNT: 12.4 % (ref 10–15)
FASTING STATUS PATIENT QL REPORTED: YES
GFR SERPL CREATININE-BSD FRML MDRD: >90 ML/MIN/1.73M2
GLUCOSE BLD-MCNC: 96 MG/DL (ref 70–125)
GLUCOSE UR STRIP-MCNC: NEGATIVE MG/DL
HCT VFR BLD AUTO: 44 % (ref 40–53)
HDLC SERPL-MCNC: 34 MG/DL
HGB BLD-MCNC: 14.7 G/DL (ref 13.3–17.7)
HGB UR QL STRIP: NEGATIVE
KETONES UR STRIP-MCNC: NEGATIVE MG/DL
LDLC SERPL CALC-MCNC: 92 MG/DL
LEUKOCYTE ESTERASE UR QL STRIP: ABNORMAL
MCH RBC QN AUTO: 29.3 PG (ref 26.5–33)
MCHC RBC AUTO-ENTMCNC: 33.4 G/DL (ref 31.5–36.5)
MCV RBC AUTO: 88 FL (ref 78–100)
NITRATE UR QL: NEGATIVE
PH UR STRIP: 5.5 [PH] (ref 5–8)
PLATELET # BLD AUTO: 205 10E3/UL (ref 150–450)
POTASSIUM BLD-SCNC: 4.4 MMOL/L (ref 3.5–5)
PROT SERPL-MCNC: 6.6 G/DL (ref 6–8)
PSA SERPL-MCNC: 3.92 UG/L (ref 0–4.5)
RBC # BLD AUTO: 5.01 10E6/UL (ref 4.4–5.9)
RBC #/AREA URNS AUTO: ABNORMAL /HPF
SODIUM SERPL-SCNC: 139 MMOL/L (ref 136–145)
SP GR UR STRIP: 1.02 (ref 1–1.03)
SQUAMOUS #/AREA URNS AUTO: ABNORMAL /LPF
TRIGL SERPL-MCNC: 78 MG/DL
UROBILINOGEN UR STRIP-ACNC: 0.2 E.U./DL
WBC # BLD AUTO: 5.3 10E3/UL (ref 4–11)
WBC #/AREA URNS AUTO: ABNORMAL /HPF

## 2022-04-18 PROCEDURE — G0103 PSA SCREENING: HCPCS | Performed by: INTERNAL MEDICINE

## 2022-04-18 PROCEDURE — 80061 LIPID PANEL: CPT | Performed by: INTERNAL MEDICINE

## 2022-04-18 PROCEDURE — 99214 OFFICE O/P EST MOD 30 MIN: CPT | Mod: 25 | Performed by: INTERNAL MEDICINE

## 2022-04-18 PROCEDURE — 85027 COMPLETE CBC AUTOMATED: CPT | Performed by: INTERNAL MEDICINE

## 2022-04-18 PROCEDURE — 99396 PREV VISIT EST AGE 40-64: CPT | Mod: 25 | Performed by: INTERNAL MEDICINE

## 2022-04-18 PROCEDURE — 91305 COVID-19,PF,PFIZER (12+ YRS): CPT | Performed by: INTERNAL MEDICINE

## 2022-04-18 PROCEDURE — 81001 URINALYSIS AUTO W/SCOPE: CPT | Performed by: INTERNAL MEDICINE

## 2022-04-18 PROCEDURE — 0054A COVID-19,PF,PFIZER (12+ YRS): CPT | Performed by: INTERNAL MEDICINE

## 2022-04-18 PROCEDURE — 36415 COLL VENOUS BLD VENIPUNCTURE: CPT | Performed by: INTERNAL MEDICINE

## 2022-04-18 PROCEDURE — 80053 COMPREHEN METABOLIC PANEL: CPT | Performed by: INTERNAL MEDICINE

## 2022-04-18 NOTE — PROGRESS NOTES
SUBJECTIVE:   CC: Ge Kiran is an 61 year old male who presents for preventative health visit.     HPI-in addition to his annual preventive physical, Slava is here to follow-up medical problems including hypercholesterolemia, ascending thoracic aortic aneurysm and BPH symptoms.  See assessment and plan for details.    Patient has been advised of split billing requirements and indicates understanding: Yes  Healthy Habits:     Getting at least 3 servings of Calcium per day:  NO    Bi-annual eye exam:  Yes    Dental care twice a year:  NO    Sleep apnea or symptoms of sleep apnea:  Excessive snoring    Diet:  Regular (no restrictions)    Frequency of exercise:  2-3 days/week    Duration of exercise:  15-30 minutes    Taking medications regularly:  Yes    Medication side effects:  None    PHQ-2 Total Score: 0    Additional concerns today:  No              Today's PHQ-2 Score:   PHQ-2 ( 1999 Pf[employer]) 4/17/2022   Q1: Little interest or pleasure in doing things 0   Q2: Feeling down, depressed or hopeless 0   PHQ-2 Score 0   Q1: Little interest or pleasure in doing things Not at all   Q2: Feeling down, depressed or hopeless Not at all   PHQ-2 Score 0       Abuse: Current or Past(Physical, Sexual or Emotional)- No  Do you feel safe in your environment? Yes        Social History     Tobacco Use     Smoking status: Former Smoker     Smokeless tobacco: Never Used     Tobacco comment: quit 25yo   Substance Use Topics     Alcohol use: Yes     Comment: Alcoholic Drinks/day: 3 beer/ week         Alcohol Use 4/17/2022   Prescreen: >3 drinks/day or >7 drinks/week? No       Last PSA:   Prostate Specific Antigen Screen   Date Value Ref Range Status   02/12/2021 2.7 0.0 - 4.5 ng/mL Final       Reviewed orders with patient. Reviewed health maintenance and updated orders accordingly - Yes  Lab work is in process    Reviewed and updated as needed this visit by clinical staff   Tobacco  Allergies  Meds  Problems  Med Hx  Surg Hx   "Fam Hx            Reviewed and updated as needed this visit by Provider   Tobacco  Allergies  Meds  Problems  Med Hx  Surg Hx  Fam Hx           Past Medical History:   Diagnosis Date     Abnormal chest x-ray 01/16/2017    Normal CT scan     Allergic rhinitis      Arthralgia 01/19/2018     BPH (benign prostatic hyperplasia)      Cervical radiculopathy 01/01/2003    Left C5-C6 foraminal stenosis with bulging disc and osteophyte complex     Change in bowel movement 01/31/2020     Chest pain 01/01/2006    Normal GXT     Fatigue 11/13/2015     Foreign body in hand, left, subsequent encounter 03/05/2018    Hand surgery 2018     Hypercholesteremia     CT coronary calcium score 102, 50 to 75th percentile.  Recommending starting statin     Osteoarthritis      Personal history of colonic polyps     Colonoscopy November 2012 with polyp.  Colonoscopy March 2019 with multiple polyps including advanced adenoma, repeat in 3 years     Tendinitis, de Quervain's 01/01/2013    Right wrist pain     Thoracic aortic aneurysm (H)     4.4 cm on CT February 2019, stable Feb 2020, recheck in 2 years      Past Surgical History:   Procedure Laterality Date     ANKLE FRACTURE SURGERY       APPENDECTOMY       CATARACT EXTRACTION Bilateral      HAND SURGERY  01/01/2018    Removal of large foreign body/splinter     TONSILLECTOMY         Review of Systems  12 point review of systems is negative other than what is discussed in assessment and plan    OBJECTIVE:   BP 94/62 (BP Location: Right arm, Patient Position: Sitting, Cuff Size: Adult Large)   Pulse 66   Ht 1.822 m (5' 11.75\")   Wt 87.1 kg (192 lb 1.6 oz)   SpO2 97%   BMI 26.24 kg/m      Physical Exam  EYES: Eyelids, conjunctiva, and sclera were normal. Pupils were normal. Cornea, iris, and lens were normal bilaterally.  HEAD, EARS, NOSE, MOUTH, AND THROAT: Head and face were normal. TMs and external auditory canals are normal  NECK: Neck appearance was normal. There were no neck " masses and the thyroid was not enlarged and no nodules are felt.  No lymphadenopathy.  RESPIRATORY: Breathing pattern was normal and the chest moved symmetrically.   Lung sounds were normal and there were no rales or wheezes.  CARDIOVASCULAR: Heart rate and rhythm were normal.  S1 and S2 were normal and there were no extra sounds or murmurs. Peripheral pulses in arms and legs were normal.  Jugular venous pressure was normal.  There was no peripheral edema.  No carotid bruits.  GASTROINTESTINAL:  Bowel sounds were present.   Palpation detected no tenderness, mass, or enlarged organs.   RECTAL/PROSTATE: No external lesions.  Sphincter tone normal.  No palpable rectal lesions.  Prostate moderately enlarged but smooth, nontender without nodules  MUSCULOSKELETAL: Skeletal configuration was normal and muscle mass was normal for age. Joint appearance was overall normal.  LYMPHATIC: There were no enlarged nodes.  SKIN/HAIR/NAILS: Skin color was normal.  Hair and nails were normal.There were no skin lesions.  NEUROLOGIC: The patient was alert and oriented to person, place, time, and circumstance. Speech was normal. Cranial nerves were normal. Motor strength was normal for age. The patient was normally coordinated.  Sensation intact.  PSYCHIATRIC:  Mood and affect were normal and the patient had normal recent and remote memory. The patient's judgment and insight were normal.        ASSESSMENT/PLAN:   1. Routine general medical examination at a health care facility  Immunizations are reviewed and providing COVID booster.  Recommending that he get his tetanus updated this year and should also have Shingrix completed.  Living will discussed.  Non-smoker.  Uses alcohol in moderation.  Regular exercise discussed.  Up to date with colonoscopies and this should be repeated this spring.  Prostate exam is performed and I will check a PSA for prostate cancer screening.   He sees his ophthalmologist every year. Skin exam performed and  "recommending regular use of sunblock.  Hepatitis C antibody for screening was normal.  Will screen for diabetes with fasting glucose.      2. Thoracic aortic aneurysm without rupture (H)  4.4 cm thoracic aortic aneurysm last evaluated in 2020.  Recommending CTA chest to confirm stability.  Asymptomatic without chest pain.  Good blood pressure control.  - CTA Chest with Contrast; Future    3. Benign prostatic hyperplasia with urinary frequency  Ongoing problems with urinary frequency and urgency only partially better with Flomax.  Avodart was not helpful.  He might be a good candidate for UroLift procedure and I am suggesting he discuss with [facility].  - UA Macro with Reflex to Micro and Culture - lab collect; Future    4. Hypercholesteremia  We will recheck lipid profile.  He continues on rosuvastatin 10 mg daily.  - Lipid Profile (Chol, Trig, HDL, LDL calc); Future    5. Personal history of colonic polyps  Schedule for a colonoscopy this spring.  Multiple polyps removed 3 years ago.    6. Screening for prostate cancer    - PSA, screen; Future        COUNSELING:   Reviewed preventive health counseling, as reflected in patient instructions       Regular exercise       Healthy diet/nutrition       Vision screening       Colorectal cancer screening       Prostate cancer screening    Estimated body mass index is 26.24 kg/m  as calculated from the following:    Height as of this encounter: 1.822 m (5' 11.75\").    Weight as of this encounter: 87.1 kg (192 lb 1.6 oz).         He reports that he has quit smoking. He has never used smokeless tobacco.      Counseling Resources:  ATP IV Guidelines  Pooled Cohorts Equation Calculator  FRAX Risk Assessment  ICSI Preventive Guidelines  Dietary Guidelines for Americans, 2010  USDA's MyPlate  ASA Prophylaxis  Lung CA Screening    Juvencio Carter MD  [facility]  "

## 2022-04-18 NOTE — PATIENT INSTRUCTIONS
CTA chest will be scheduled for follow-up ascending thoracic aneurysm    I would recommend scheduling appointment at [facility] to discuss options for management of your enlarged prostate.  [phone].  Dr. Gonzalez or Dr. King are both excellent.    Preventive Health Recommendations  Male Ages 50 - 64    Yearly exam:             See your health care provider every year in order to  o   Review health changes.   o   Discuss preventive care.    o   Review your medicines if your doctor has prescribed any.   Annual cholesterol panel  Diabetes screening  Colonoscopy is due this spring.  PSA and exam annually for prostate cancer screening  You should be tested each year for STDs (sexually transmitted diseases), if you re at risk.     Shots: Get a flu shot each year. Get a tetanus shot every 10 years.  You are due to have your tetanus updated before the end of September 2022.  You can get a Td vaccine at your pharmacy.  I would also recommend getting the shingles vaccine, Shingrix.  This can also be obtained at your pharmacy.    Nutrition:  Eat at least 5 servings of fruits and vegetables daily.   Eat whole-grain bread, whole-wheat pasta and brown rice instead of white grains and rice.   Get adequate Calcium and Vitamin D.     Lifestyle  Exercise for at least 150 minutes a week (30 minutes a day, 5 days a week). This will help you control your weight and prevent disease.   Limit alcohol to one drink per day.   No smoking.   Wear sunscreen to prevent skin cancer.   See your dentist every six months for an exam and cleaning.   See your eye doctor every 1 to 2 years.

## 2022-05-02 ENCOUNTER — HOSPITAL ENCOUNTER (OUTPATIENT)
Dept: CT IMAGING | Facility: CLINIC | Age: 62
Discharge: HOME | End: 2022-05-02
Attending: INTERNAL MEDICINE | Admitting: INTERNAL MEDICINE
Payer: COMMERCIAL

## 2022-05-02 DIAGNOSIS — I71.20 THORACIC AORTIC ANEURYSM WITHOUT RUPTURE (H): ICD-10-CM

## 2022-05-02 PROCEDURE — 250N000011 HC RX IP 250 OP 636: Performed by: INTERNAL MEDICINE

## 2022-05-02 PROCEDURE — 71275 CT ANGIOGRAPHY CHEST: CPT

## 2022-05-02 RX ORDER — IOPAMIDOL 755 MG/ML
100 INJECTION, SOLUTION INTRAVASCULAR ONCE
Status: COMPLETED | OUTPATIENT
Start: 2022-05-02 | End: 2022-05-02

## 2022-05-02 RX ADMIN — IOPAMIDOL 100 ML: 755 INJECTION, SOLUTION INTRAVENOUS at 17:30

## 2022-05-03 PROBLEM — I77.810 ASCENDING AORTA DILATION (H): Status: ACTIVE | Noted: 2022-05-03

## 2022-09-26 DIAGNOSIS — R39.12 BENIGN PROSTATIC HYPERPLASIA WITH WEAK URINARY STREAM: ICD-10-CM

## 2022-09-26 DIAGNOSIS — N40.1 BENIGN PROSTATIC HYPERPLASIA WITH WEAK URINARY STREAM: ICD-10-CM

## 2022-09-26 RX ORDER — TAMSULOSIN HYDROCHLORIDE 0.4 MG/1
CAPSULE ORAL
Qty: 90 CAPSULE | Refills: 3 | Status: SHIPPED | OUTPATIENT
Start: 2022-09-26 | End: 2023-10-23

## 2022-09-26 NOTE — TELEPHONE ENCOUNTER
"Routing refill request to provider for review/approval because:  A break in medication    Last Written Prescription Date:  6/24/2021  Last Fill Quantity: 90,  # refills: 3   Last office visit provider:  4/18/2022     Requested Prescriptions   Pending Prescriptions Disp Refills     tamsulosin (FLOMAX) 0.4 MG capsule [Pharmacy Med Name: TAMSULOSIN HCL CAPS 0.4MG] 90 capsule 3     Sig: TAKE 1 CAPSULE AT BEDTIME       Alpha Blockers Passed - 9/26/2022  7:20 AM        Passed - Blood pressure under 140/90 in past 12 months     BP Readings from Last 3 Encounters:   04/18/22 94/62   06/24/21 118/70   02/12/21 122/81                 Passed - Recent (12 mo) or future (30 days) visit within the authorizing provider's specialty     Patient has had an office visit with the authorizing provider or a provider within the authorizing providers department within the previous 12 mos or has a future within next 30 days. See \"Patient Info\" tab in inbasket, or \"Choose Columns\" in Meds & Orders section of the refill encounter.              Passed - Patient does not have Tadalafil, Vardenafil, or Sildenafil on their medication list        Passed - Medication is active on med list        Passed - Patient is 18 years of age or older             Marcela Hernandez RN 09/26/22 4:37 PM  "

## 2022-10-15 ENCOUNTER — HEALTH MAINTENANCE LETTER (OUTPATIENT)
Age: 62
End: 2022-10-15

## 2022-11-03 ENCOUNTER — TRANSFERRED RECORDS (OUTPATIENT)
Dept: HEALTH INFORMATION MANAGEMENT | Facility: CLINIC | Age: 62
End: 2022-11-03

## 2023-02-09 ENCOUNTER — ALLIED HEALTH/NURSE VISIT (OUTPATIENT)
Dept: RESEARCH | Facility: CLINIC | Age: 63
End: 2023-02-09
Payer: COMMERCIAL

## 2023-02-09 VITALS
HEIGHT: 72 IN | BODY MASS INDEX: 25.73 KG/M2 | HEART RATE: 66 BPM | DIASTOLIC BLOOD PRESSURE: 81 MMHG | SYSTOLIC BLOOD PRESSURE: 117 MMHG | RESPIRATION RATE: 12 BRPM | WEIGHT: 190 LBS | OXYGEN SATURATION: 95 %

## 2023-02-09 DIAGNOSIS — Z00.6 EXAMINATION OF PARTICIPANT OR CONTROL IN CLINICAL RESEARCH: Primary | ICD-10-CM

## 2023-02-09 PROCEDURE — 99207 PR NO CHARGE NURSE ONLY: CPT

## 2023-02-09 NOTE — PROGRESS NOTES
[facility] In-Person Study Note    Study Description: The purpose of this study is to collect sleep data for product research and development. We will compare the performance of the Smartwatch to a medical-grade Home Sleep Test (HST). We hope to learn whether the Smartwatch can be used to track sleep quality at home.       Subject ID:      SCREENING     Demographic Info  Ge Kiran   1960          62 year old  male    Race: White  Ethnicity: Non-/     Dodge Scale: OBAN Dodge: 4    Medical History:  Past Medical History:   Diagnosis Date     BPH (benign prostatic hyperplasia)      Fatigue 11/13/2015     Hypercholesteremia     CT coronary calcium score 102, 50 to 75th percentile.  Recommending starting statin       Sleep Apnea Diagnosis: No    Allergies:  No Known Allergies     Current Medications:     Review of your medicines          Accurate as of February 9, 2023  1:17 PM. If you have any questions, ask your nurse or doctor.            CONTINUE these medicines which have NOT CHANGED      Dose / Directions   rosuvastatin 10 MG tablet  3/10/2022 - present  Commonly known as: CRESTOR  Used for: Hypercholesteremia      TAKE 1 TABLET AT BEDTIME  Quantity: 90 tablet  Refills: 3     tamsulosin 0.4 MG capsule  9/26/2022 - present  Commonly known as: FLOMAX  Used for: Benign prostatic hyperplasia with weak urinary stream      TAKE 1 CAPSULE AT BEDTIME  Quantity: 90 capsule  Refills: 3            Past Surgical History:  Past Surgical History:   Procedure Laterality Date     ANKLE FRACTURE SURGERY       APPENDECTOMY       CATARACT EXTRACTION Bilateral      HAND SURGERY  01/01/2018     TONSILLECTOMY                Vitals:  Time Subject Sat: 13:00   /81 (BP Location: Left arm, Patient Position: Sitting, Cuff Size: Adult Large)   Pulse 66   Resp 12   Ht 1.829 m (6')   Wt 86.2 kg (190 lb)   SpO2 95%   BMI 25.77 kg/m         Lifestyle:  Occupation: Construction    Do you have a  Bed Partner/Co-Sleeper? Yes   Previous Sleep Study?: No       (If Yes) Initial AHI Score: N/A    Alcohol Use: 2 drinks/week  Smoking History: No      Measurements & Preferences:  Dominant Hand: Right   Preferred Watch Wrist: Left    Left Wrist:  Circumference (mm): 182   Hairiness: C: Thick Hair, Low Density  Tattoos, Moles, Scars? None    Right Wrist:   Circumference (mm): 190   Hairiness: C: Thick Hair, Low Density  Tattoos, Moles, Scars? None    Was data collected?: Yes    ENROLLMENT & DEVICES      Device IDs:  Watch ID: [id number]     Watch Size: 44 mm   Band Size: M/L  Natural Notch: 5   Secure Notch: 5   Watch Setting Worn: 5   Phone ID: [id number]  Nox ID: [id number]     Has the Subject Enrolled in the Study [place]: Yes   Confirmation of Enrollment?: Yes   Enrollment Date: 9-FEB-2023  Smartwatch Training Completed? Yes   Smartwatch Training Date: 9-FEB-2023  HSAT Training Completed? Yes   HSAT Training Date: 9-FEB-2023 9-FEB-2023  WAQAS Patel

## 2023-02-09 NOTE — PROGRESS NOTES
"  [facility] Sleep Study Inclusion/Exclusion Criteria:    Study Name: [facility]  : Tiffany Frye MD    Study Description: The purpose of this study is to collect sleep data for product research and development. We will compare the performance of the Smartwatch to a medical-grade Home Sleep Test (HST). We hope to learn whether the Smartwatch can be used to track sleep quality at home.    Protocol Version: 3.0  22-DEC-2022     Criteria #  Inclusion Criteria (ALL MUST BE YES)  YES/NO/N/A   1  Adult (age > 18 years old) Yes   2  Subject has a reliable WiFi network (examples of \"reliable\": able to video-conference call with a clear image, able to stream movies or video without interruption, play online computer games) Yes   3  Subject has access to a computer or smartphone with audiovisual capabilities (e.g., webcam and microphone/speaker*)  Yes   4  Subject is willing to comply with the study procedures    Yes         * applicable for subjects that are remotely consented and/or trained.     Criteria # Exclusion Criteria (ALL MUST BE NO) YES/NO/N/A   1  Active COVID infection   No   2  Decisionally impaired participants (no surrogate consenting is allowed)    No   3  Not understanding written and spoken English     No   4  Open wounds(s) on the wrist and/or forearm (in area where Smartwatch would be worn) No   5  Tattoos, large moles or scars on the dorsal aspect of wrist where the Smartwatch would be worn; individuals with tattoo on only one wrist may participate, if Smartwatch is worn on non-tattooed wrist    No   6  Known sensitivity or allergy to component of the Smartwatch   No   7  Planned travel across 2 or more times zones during study participation   No   8  Planned travel away from home for more than 5 days during the study period No   9  Overnight rotating shift work     No   10  Active and adherent to treatment for sleep apnea   (e.g., CPAP, dental appliance, Hypoglossal nerve stimulator)    " No   11  Plans to start treatment for apnea within the study timeframe    No   12  Overnight supplemental oxygen use   No   13    Employed by a company that develops or sells medical and/or fitness devices (e.g., ECG monitors, wearable fitness bands, sleep monitors, etc.) or are technology journalists (e.g., professional bloggers, TV, magazine, newspaper reporters, etc.) No   14    Participated in a previous sleep study that used a wrist-worn sensor device by same sponsor  No     Patient does fulfill study inclusion criteria and no exclusion criteria are found.     Tiffany Frye MD    9-FEB-2023    Cassandra Pacheco EP

## 2023-02-09 NOTE — PROGRESS NOTES
[place] Study Consent    Study Description: The purpose of this study is to collect sleep data for product research and development. We will compare the performance of the Smartwatch to a medical-grade Home Sleep Test (HST). We hope to learn whether the Smartwatch can be used to track sleep quality at home.        Ge Kiran a 62 year old male, was on-site  today to discuss participation in the [place] sleep data collection study.     The consent form was reviewed with the patient.     The review of the study included:    Study Purpose     COVID-19 Criteria     Participant Responsibilities      Study Data and Devices    Benefits and Risks of Participation    Compensation and Costs of Participation    Voluntary Participation    Confidentiality     Injury and Legal Rights    Protocol Version: 3.0    The subject was queried in regards to his willingness to continue and his questions were answered to his satisfaction.     The patient has given his agreement to volunteer and participate in the above noted study.     The Consent  and HIPAA form version 3.0 6-JAN-2023 was signed at 13:00  on  9-FEB-2023 with the Clinical Research Unit of [facility].     A copy of the [place] consent will be placed in subject's medical record. A copy of the consent form was given to the subject today.    Study data is directly entered into Epic and [employer] per protocol.     No study procedures were done prior to Ge Kiran providing informed consent.       9-FEB-2023    WAQAS Patel

## 2023-02-16 ENCOUNTER — ALLIED HEALTH/NURSE VISIT (OUTPATIENT)
Dept: RESEARCH | Facility: CLINIC | Age: 63
End: 2023-02-16
Payer: COMMERCIAL

## 2023-02-16 DIAGNOSIS — Z00.6 EXAMINATION OF PARTICIPANT OR CONTROL IN CLINICAL RESEARCH: Primary | ICD-10-CM

## 2023-02-16 PROCEDURE — 99207 PR NO CHARGE NURSE ONLY: CPT

## 2023-02-16 NOTE — PROGRESS NOTES
. [place] HSAT Kit Return  Study Description: The purpose of this study is to collect sleep data for product research and development. We will compare the performance of the Smartwatch to a medical-grade Home Sleep Test (HST). We hope to learn whether the Smartwatch can be used to track sleep quality at home.      Subject Number:     Study Day: Week 2    Tracking Number: N/A    Compliance Questions:  Did you wear a T-Shirt over the heart monitor? NA  Did you double tape device tubing/wires?NA  Did you turn-off your heart monitor each morning after collection?Yes  Did all equipment function correctly and stay-on throughout night?Yes  Did you see the red light underneath the watch turn-on both nights?Yes      Participant returned HSAT kit with all devices returned. Participant verbalized understanding that if their data is unusable per sponsor, they will conduct one additional set of HSAT recordings. All other questions/concerns addressed.     Adverse Events & Con Med Assessment Performed?   [x]     (If yes, please generate adverse event report for PI to cosign)    16-FEB-2023    WAQAS Patel

## 2023-02-23 ENCOUNTER — VIRTUAL VISIT (OUTPATIENT)
Dept: RESEARCH | Facility: CLINIC | Age: 63
End: 2023-02-23
Payer: COMMERCIAL

## 2023-02-23 DIAGNOSIS — Z00.6 EXAMINATION OF PARTICIPANT OR CONTROL IN CLINICAL RESEARCH: Primary | ICD-10-CM

## 2023-02-23 PROCEDURE — 99207 PR NO CHARGE NURSE ONLY: CPT

## 2023-02-23 NOTE — PROGRESS NOTES
" [place] Study Phone Visit    Study Description: The purpose of this study is to collect sleep data for product research and development. We will compare the performance of the Smartwatch to a medical-grade Home Sleep Test (HST). We hope to learn whether the Smartwatch can be used to track sleep quality at home.      Subject Number:     Study Day: Week 3    [place] Study Subject was called today to:    Review Compliance     Answer subject questions    Deliver study protocol reminders to subject    Reminders Checklist:   [x]  Connected to WiFi  [x]  Completing both morning and evening surveys  [x]  Watch and Phone on  near each other after completed morning survey   [x]  Take devices off before showering/getting them wet  [x]  Make sure to dismiss any update notifications. -Tap \"Not Now\"  [x]  Good HSAT   [x]  Remind them of next appointment with us     (Applicable during last 4 days)  [x]  NO WATCH WEAR JUST SURVEY     Adverse Events & Con Med Assessment Performed?   [x]     (If yes, please generate adverse event report for PI to cosign)      23-FEB-2023    Sree Willis      "

## 2023-02-24 ENCOUNTER — TELEPHONE (OUTPATIENT)
Dept: RESEARCH | Facility: CLINIC | Age: 63
End: 2023-02-24
Payer: COMMERCIAL

## 2023-02-24 DIAGNOSIS — R29.818 SUSPECTED SLEEP APNEA: Primary | ICD-10-CM

## 2023-02-24 NOTE — TELEPHONE ENCOUNTER
[place] HSAT Results Phone Call  Study Description: The purpose of this study is to collect sleep data for product research and development. We will compare the performance of the Smartwatch to a medical-grade Home Sleep Test (HST). We hope to learn whether the Smartwatch can be used to track sleep quality at home.      Ge Kiran was called today to review results of his Home Sleep Test (HSAT).     They were informed of the significance of the results and they affirmed understanding. Additionally I informed them that these results are available for review by their provider in their chart.  They had no further questions at this time.    Additional Comments: Lowest O2 84% both nights; < 90% 101 and 85 minutes;  <88% 37 and 28 minutes; HR ; Snore 36.3-47.6%. Discussed mouthguard as most episodes were noted while on his back; followup with primary provider recommended- staff message sent via EPIC.    AHI Score: AHI of 15 to less than 30 (Moderate EDIS): Scores 19.1, 19.1, 24.2 and 24.6    Results:  NIGHT 1  Was it scorable?: Yes     Channel Presence: Scored and all 6 channels present  Select Missing Channels: N/A     (If applicable)      Analysis Start Date: 2/12/23   Analysis Duration: 7hr 24min  Analysis Start Time: 9:40 pm       Analysis Stop Time: 5:04 am   Est Total Sleep Time: 7 hr 22 min      NIGHT 1 Scorer 1 Scorer 2   Respiratory Parameters   Apnea + Hypopneas (AH)   Total    19.1 /h   19.1 /h   Supine    36 /h   35.2 /h   Non-Supine   10.3 /h   10.7 /h   Count   141    141        Total Total    Obstructive (OA)    8 /h   7.7 /h   Hypopneas    9.4 /h   9.2 /h   Central Apnea Hypopnea (CA+CH)    0.8 /h   1.6 /h   Oxygen Saturation (SpO2)   Oxygen Desaturation Index (DIANE)    18.6 /h   18.6 /h   Minimum SpO2    84 %   84 %   SpO2 Duration < 88%    8.4 %   8.4 %   Average Desat Drop    5 %   5.1 %   Position & Analysis Time   Supine (in TST) Duration    151.8 min   151.8 min       NIGHT 2  Was it  scorable?: Yes (If no, delete the table below)     Channel Presence: Scored and all 6 channels present  Select Missing Channels: N/A     (If applicable)    Analysis Start Date: 2/13/23   Analysis Duration: 7hr 28min  Analysis Start Time: 9:30 pm       Analysis Stop Time: 4:58 am   Est Total Sleep Time: 7hr 20min       NIGHT 2 Scorer 1 Scorer 2   Respiratory Parameters   Apnea + Hypopneas (AH)   Total    24.2 /h   24.6 /h   Supine    37.7 /h   38.7 /h   Non-Supine   14.4 /h   14.4 /h   Count   178    181        Total Total    Obstructive (OA)    8.9 /h   10.8 /h   Hypopneas    15.1 /h   13.8 /h   Central Apnea Hypopnea (CA+CH)    0 /h   0 /h   Oxygen Saturation (SpO2)   Oxygen Desaturation Index (DIANE)    25.1 /h   25.1 /h   Minimum SpO2    84 %   84 %   SpO2 Duration < 88%    6.3 %   6.3 %   Average Desat Drop    4.8 %   4.8 %   Position & Analysis Time   Supine (in TST) Duration    186.2 min   186.2 min       24-FEB-2023    Cande Guajardo PA-C

## 2023-03-02 ENCOUNTER — VIRTUAL VISIT (OUTPATIENT)
Dept: RESEARCH | Facility: CLINIC | Age: 63
End: 2023-03-02
Payer: COMMERCIAL

## 2023-03-02 DIAGNOSIS — Z00.6 EXAMINATION OF PARTICIPANT OR CONTROL IN CLINICAL RESEARCH: Primary | ICD-10-CM

## 2023-03-02 PROCEDURE — 99207 PR NO CHARGE NURSE ONLY: CPT

## 2023-03-02 NOTE — PROGRESS NOTES
" [place] Study Phone Visit    Study Description: The purpose of this study is to collect sleep data for product research and development. We will compare the performance of the Smartwatch to a medical-grade Home Sleep Test (HST). We hope to learn whether the Smartwatch can be used to track sleep quality at home.      Subject Number:     Study Day: Week 4    [place] Study Subject was called today to:    Review Compliance     Answer subject questions    Deliver study protocol reminders to subject    Reminders Checklist:   [x]  Connected to WiFi  [x]  Completing both morning and evening surveys  [x]  Watch and Phone on  near each other after completed morning survey   [x]  Take devices off before showering/getting them wet  [x]  Make sure to dismiss any update notifications. -Tap \"Not Now\"  [x]  Remind them of next appointment with us       Adverse Events & Con Med Assessment Performed?   [x]     (If yes, please generate adverse event report for PI to cosign)      2-MAR-2023    Stas Al    "

## 2023-03-09 ENCOUNTER — VIRTUAL VISIT (OUTPATIENT)
Dept: RESEARCH | Facility: CLINIC | Age: 63
End: 2023-03-09
Payer: COMMERCIAL

## 2023-03-09 DIAGNOSIS — Z00.6 EXAMINATION OF PARTICIPANT OR CONTROL IN CLINICAL RESEARCH: Primary | ICD-10-CM

## 2023-03-09 PROCEDURE — 99207 PR NO CHARGE NURSE ONLY: CPT

## 2023-03-09 NOTE — PROGRESS NOTES
" [place] Study Phone Visit    Study Description: The purpose of this study is to collect sleep data for product research and development. We will compare the performance of the Smartwatch to a medical-grade Home Sleep Test (HST). We hope to learn whether the Smartwatch can be used to track sleep quality at home.      Subject Number:     Study Day: Week 5    [place] Study Subject was called today to:    Review Compliance     Answer subject questions    Deliver study protocol reminders to subject    Reminders Checklist:   [x]  Connected to WiFi  [x]  Completing both morning and evening surveys  [x]  Watch and Phone on  near each other after completed morning survey   [x]  Take devices off before showering/getting them wet  [x]  Make sure to dismiss any update notifications. -Tap \"Not Now\"   [x]  Remind them of next appointment with us     (Applicable during last 4 days)  [x]  NO WATCH WEAR JUST SURVEY     Adverse Events & Con Med Assessment Performed?   [x]     (If yes, please generate adverse event report for PI to cosign)      9-MAR-2023    Emmie Segovia      "

## 2023-03-15 ENCOUNTER — ALLIED HEALTH/NURSE VISIT (OUTPATIENT)
Dept: RESEARCH | Facility: CLINIC | Age: 63
End: 2023-03-15
Payer: COMMERCIAL

## 2023-03-15 DIAGNOSIS — Z00.6 RESEARCH SUBJECT: Primary | ICD-10-CM

## 2023-03-15 PROCEDURE — 99207 PR NO CHARGE NURSE ONLY: CPT

## 2023-03-15 NOTE — PROGRESS NOTES
[place] Study End Note    Study Description: The purpose of this study is to collect sleep data for product research and development. We will compare the performance of the Smartwatch to a medical-grade Home Sleep Test (HST). We hope to learn whether the Smartwatch can be used to track sleep quality at home.        Study Termination/Completion Date: 15-MAR-2023  Reason for Termination (If Applicable): Completed     Subject returned all study devices today and this completes this study for the subject.    Adverse Events & Con Med Assessment Performed?   [x] None      15-MAR-2023    Gabrielle Barry RN

## 2023-04-03 DIAGNOSIS — E78.00 HYPERCHOLESTEREMIA: ICD-10-CM

## 2023-04-03 RX ORDER — ROSUVASTATIN CALCIUM 10 MG/1
TABLET, COATED ORAL
Qty: 90 TABLET | Refills: 0 | Status: SHIPPED | OUTPATIENT
Start: 2023-04-03 | End: 2023-10-23

## 2023-04-03 NOTE — TELEPHONE ENCOUNTER
"Last Written Prescription Date:  3/10/2022  Last Fill Quantity: 90,  # refills: 3   Last office visit provider:  4/18/2022     Requested Prescriptions   Pending Prescriptions Disp Refills     rosuvastatin (CRESTOR) 10 MG tablet [Pharmacy Med Name: ROSUVASTATIN TABS 10MG] 90 tablet 3     Sig: TAKE 1 TABLET AT BEDTIME       Statins Protocol Passed - 4/3/2023  4:35 AM        Passed - LDL on file in past 12 months     Recent Labs   Lab Test 04/18/22  1519   LDL 92             Passed - No abnormal creatine kinase in past 12 months     No lab results found.             Passed - Recent (12 mo) or future (30 days) visit within the authorizing provider's specialty     Patient has had an office visit with the authorizing provider or a provider within the authorizing providers department within the previous 12 mos or has a future within next 30 days. See \"Patient Info\" tab in inbasket, or \"Choose Columns\" in Meds & Orders section of the refill encounter.              Passed - Medication is active on med list        Passed - Patient is age 18 or older             Eda Garcia RN 04/03/23 6:00 AM  "

## 2023-05-15 ENCOUNTER — TRANSFERRED RECORDS (OUTPATIENT)
Dept: HEALTH INFORMATION MANAGEMENT | Facility: CLINIC | Age: 63
End: 2023-05-15
Payer: COMMERCIAL

## 2023-06-01 ENCOUNTER — HEALTH MAINTENANCE LETTER (OUTPATIENT)
Age: 63
End: 2023-06-01

## 2023-06-22 ENCOUNTER — TRANSFERRED RECORDS (OUTPATIENT)
Dept: HEALTH INFORMATION MANAGEMENT | Facility: CLINIC | Age: 63
End: 2023-06-22
Payer: COMMERCIAL

## 2023-10-03 ENCOUNTER — TRANSFERRED RECORDS (OUTPATIENT)
Dept: HEALTH INFORMATION MANAGEMENT | Facility: CLINIC | Age: 63
End: 2023-10-03
Payer: COMMERCIAL

## 2023-10-10 ENCOUNTER — TRANSFERRED RECORDS (OUTPATIENT)
Dept: HEALTH INFORMATION MANAGEMENT | Facility: CLINIC | Age: 63
End: 2023-10-10
Payer: COMMERCIAL

## 2023-10-16 ENCOUNTER — TRANSFERRED RECORDS (OUTPATIENT)
Dept: HEALTH INFORMATION MANAGEMENT | Facility: CLINIC | Age: 63
End: 2023-10-16
Payer: COMMERCIAL

## 2023-10-16 ASSESSMENT — ENCOUNTER SYMPTOMS
HEMATURIA: 0
SHORTNESS OF BREATH: 0
ARTHRALGIAS: 0
HEARTBURN: 0
DIARRHEA: 0
DYSURIA: 0
PALPITATIONS: 0
CONSTIPATION: 0
CHILLS: 0
PARESTHESIAS: 0
HEADACHES: 0
WEAKNESS: 0
EYE PAIN: 0
HEMATOCHEZIA: 0
DIZZINESS: 0
SORE THROAT: 0
ABDOMINAL PAIN: 0
MYALGIAS: 0
COUGH: 0
NERVOUS/ANXIOUS: 0
FEVER: 0
JOINT SWELLING: 0
FREQUENCY: 1
NAUSEA: 0

## 2023-10-19 ENCOUNTER — TRANSFERRED RECORDS (OUTPATIENT)
Dept: HEALTH INFORMATION MANAGEMENT | Facility: CLINIC | Age: 63
End: 2023-10-19
Payer: COMMERCIAL

## 2023-10-23 ENCOUNTER — OFFICE VISIT (OUTPATIENT)
Dept: INTERNAL MEDICINE | Facility: CLINIC | Age: 63
End: 2023-10-23
Payer: COMMERCIAL

## 2023-10-23 VITALS
HEART RATE: 62 BPM | RESPIRATION RATE: 16 BRPM | DIASTOLIC BLOOD PRESSURE: 74 MMHG | HEIGHT: 72 IN | SYSTOLIC BLOOD PRESSURE: 110 MMHG | WEIGHT: 197 LBS | TEMPERATURE: 98.2 F | BODY MASS INDEX: 26.68 KG/M2 | OXYGEN SATURATION: 96 %

## 2023-10-23 DIAGNOSIS — N40.1 BENIGN PROSTATIC HYPERPLASIA WITH URINARY FREQUENCY: ICD-10-CM

## 2023-10-23 DIAGNOSIS — R13.12 OROPHARYNGEAL DYSPHAGIA: ICD-10-CM

## 2023-10-23 DIAGNOSIS — E78.00 HYPERCHOLESTEREMIA: ICD-10-CM

## 2023-10-23 DIAGNOSIS — R39.12 BENIGN PROSTATIC HYPERPLASIA WITH WEAK URINARY STREAM: ICD-10-CM

## 2023-10-23 DIAGNOSIS — G47.33 MILD OBSTRUCTIVE SLEEP APNEA: ICD-10-CM

## 2023-10-23 DIAGNOSIS — Z00.00 ROUTINE GENERAL MEDICAL EXAMINATION AT A HEALTH CARE FACILITY: Primary | ICD-10-CM

## 2023-10-23 DIAGNOSIS — R35.0 BENIGN PROSTATIC HYPERPLASIA WITH URINARY FREQUENCY: ICD-10-CM

## 2023-10-23 DIAGNOSIS — I77.810 ASCENDING AORTA DILATION (H): ICD-10-CM

## 2023-10-23 DIAGNOSIS — Z86.0100 PERSONAL HISTORY OF COLONIC POLYPS: ICD-10-CM

## 2023-10-23 DIAGNOSIS — N40.1 BENIGN PROSTATIC HYPERPLASIA WITH WEAK URINARY STREAM: ICD-10-CM

## 2023-10-23 PROBLEM — R29.818 SUSPECTED SLEEP APNEA: Status: RESOLVED | Noted: 2023-02-24 | Resolved: 2023-10-23

## 2023-10-23 LAB
ALBUMIN SERPL BCG-MCNC: 4.2 G/DL (ref 3.5–5.2)
ALBUMIN UR-MCNC: NEGATIVE MG/DL
ALP SERPL-CCNC: 79 U/L (ref 40–129)
ALT SERPL W P-5'-P-CCNC: 29 U/L (ref 0–70)
ANION GAP SERPL CALCULATED.3IONS-SCNC: 10 MMOL/L (ref 7–15)
APPEARANCE UR: CLEAR
AST SERPL W P-5'-P-CCNC: 26 U/L (ref 0–45)
BACTERIA #/AREA URNS HPF: ABNORMAL /HPF
BILIRUB SERPL-MCNC: 0.7 MG/DL
BILIRUB UR QL STRIP: NEGATIVE
BUN SERPL-MCNC: 8.3 MG/DL (ref 8–23)
CALCIUM SERPL-MCNC: 9 MG/DL (ref 8.8–10.2)
CHLORIDE SERPL-SCNC: 106 MMOL/L (ref 98–107)
CHOLEST SERPL-MCNC: 124 MG/DL
COLOR UR AUTO: YELLOW
CREAT SERPL-MCNC: 0.88 MG/DL (ref 0.67–1.17)
DEPRECATED HCO3 PLAS-SCNC: 23 MMOL/L (ref 22–29)
EGFRCR SERPLBLD CKD-EPI 2021: >90 ML/MIN/1.73M2
ERYTHROCYTE [DISTWIDTH] IN BLOOD BY AUTOMATED COUNT: 12.8 % (ref 10–15)
GLUCOSE SERPL-MCNC: 97 MG/DL (ref 70–99)
GLUCOSE UR STRIP-MCNC: NEGATIVE MG/DL
HCT VFR BLD AUTO: 42.5 % (ref 40–53)
HDLC SERPL-MCNC: 31 MG/DL
HGB BLD-MCNC: 14.4 G/DL (ref 13.3–17.7)
HGB UR QL STRIP: ABNORMAL
KETONES UR STRIP-MCNC: NEGATIVE MG/DL
LDLC SERPL CALC-MCNC: 71 MG/DL
LEUKOCYTE ESTERASE UR QL STRIP: ABNORMAL
MCH RBC QN AUTO: 29.8 PG (ref 26.5–33)
MCHC RBC AUTO-ENTMCNC: 33.9 G/DL (ref 31.5–36.5)
MCV RBC AUTO: 88 FL (ref 78–100)
NITRATE UR QL: NEGATIVE
NONHDLC SERPL-MCNC: 93 MG/DL
PH UR STRIP: 6 [PH] (ref 5–8)
PLATELET # BLD AUTO: 179 10E3/UL (ref 150–450)
POTASSIUM SERPL-SCNC: 4 MMOL/L (ref 3.4–5.3)
PROT SERPL-MCNC: 6.7 G/DL (ref 6.4–8.3)
PSA SERPL DL<=0.01 NG/ML-MCNC: 4.74 NG/ML (ref 0–4.5)
RBC # BLD AUTO: 4.84 10E6/UL (ref 4.4–5.9)
RBC #/AREA URNS AUTO: ABNORMAL /HPF
SODIUM SERPL-SCNC: 139 MMOL/L (ref 135–145)
SP GR UR STRIP: 1.01 (ref 1–1.03)
SQUAMOUS #/AREA URNS AUTO: ABNORMAL /LPF
TRIGL SERPL-MCNC: 110 MG/DL
UROBILINOGEN UR STRIP-ACNC: 0.2 E.U./DL
WBC # BLD AUTO: 5.8 10E3/UL (ref 4–11)
WBC #/AREA URNS AUTO: ABNORMAL /HPF

## 2023-10-23 PROCEDURE — 90471 IMMUNIZATION ADMIN: CPT | Performed by: INTERNAL MEDICINE

## 2023-10-23 PROCEDURE — 90480 ADMN SARSCOV2 VAC 1/ONLY CMP: CPT | Performed by: INTERNAL MEDICINE

## 2023-10-23 PROCEDURE — 99396 PREV VISIT EST AGE 40-64: CPT | Mod: 25 | Performed by: INTERNAL MEDICINE

## 2023-10-23 PROCEDURE — 99214 OFFICE O/P EST MOD 30 MIN: CPT | Mod: 25 | Performed by: INTERNAL MEDICINE

## 2023-10-23 PROCEDURE — G0103 PSA SCREENING: HCPCS | Performed by: INTERNAL MEDICINE

## 2023-10-23 PROCEDURE — 85027 COMPLETE CBC AUTOMATED: CPT | Performed by: INTERNAL MEDICINE

## 2023-10-23 PROCEDURE — 91320 SARSCV2 VAC 30MCG TRS-SUC IM: CPT | Performed by: INTERNAL MEDICINE

## 2023-10-23 PROCEDURE — 80053 COMPREHEN METABOLIC PANEL: CPT | Performed by: INTERNAL MEDICINE

## 2023-10-23 PROCEDURE — 87086 URINE CULTURE/COLONY COUNT: CPT | Performed by: INTERNAL MEDICINE

## 2023-10-23 PROCEDURE — 80061 LIPID PANEL: CPT | Performed by: INTERNAL MEDICINE

## 2023-10-23 PROCEDURE — 36415 COLL VENOUS BLD VENIPUNCTURE: CPT | Performed by: INTERNAL MEDICINE

## 2023-10-23 PROCEDURE — 81001 URINALYSIS AUTO W/SCOPE: CPT | Performed by: INTERNAL MEDICINE

## 2023-10-23 PROCEDURE — 90682 RIV4 VACC RECOMBINANT DNA IM: CPT | Performed by: INTERNAL MEDICINE

## 2023-10-23 RX ORDER — ROSUVASTATIN CALCIUM 10 MG/1
10 TABLET, COATED ORAL AT BEDTIME
Qty: 90 TABLET | Refills: 3 | Status: SHIPPED | OUTPATIENT
Start: 2023-10-23

## 2023-10-23 RX ORDER — TAMSULOSIN HYDROCHLORIDE 0.4 MG/1
0.4 CAPSULE ORAL AT BEDTIME
Qty: 90 CAPSULE | Refills: 3 | Status: ON HOLD | OUTPATIENT
Start: 2023-10-23 | End: 2024-05-21

## 2023-10-23 ASSESSMENT — ENCOUNTER SYMPTOMS
CHILLS: 0
SHORTNESS OF BREATH: 0
COUGH: 0
HEADACHES: 0
FEVER: 0
HEARTBURN: 0
FREQUENCY: 1
JOINT SWELLING: 0
CONSTIPATION: 0
DIARRHEA: 0
SORE THROAT: 0
NERVOUS/ANXIOUS: 0
HEMATURIA: 0
EYE PAIN: 0
PALPITATIONS: 0
ARTHRALGIAS: 0
PARESTHESIAS: 0
HEMATOCHEZIA: 0
ABDOMINAL PAIN: 0
NAUSEA: 0
DYSURIA: 0
WEAKNESS: 0
DIZZINESS: 0
MYALGIAS: 0

## 2023-10-23 NOTE — PROGRESS NOTES
SUBJECTIVE:   CC: Slava is an 62 year old who presents for preventative health visit.     62-year-old male here for his annual physical and follow-up medical problems including hypercholesterolemia, mild sleep apnea and BPH.  See assessment and plan for details.          10/23/2023     1:14 PM   Additional Questions   Roomed by        Healthy Habits:     Getting at least 3 servings of Calcium per day:  Yes    Bi-annual eye exam:  NO    Dental care twice a year:  Yes    Sleep apnea or symptoms of sleep apnea:  Sleep apnea    Diet:  Regular (no restrictions)    Frequency of exercise:  2-3 days/week    Duration of exercise:  30-45 minutes    Taking medications regularly:  Yes    Medication side effects:  Not applicable    Additional concerns today:  No      Today's PHQ-2 Score:       10/23/2023     1:06 PM   PHQ-2 ( 1999 [employer])   Q1: Little interest or pleasure in doing things 0   Q2: Feeling down, depressed or hopeless 0   PHQ-2 Score 0   Q1: Little interest or pleasure in doing things Not at all   Q2: Feeling down, depressed or hopeless Not at all   PHQ-2 Score 0                       Social History     Tobacco Use    Smoking status: Former     Passive exposure: Past    Smokeless tobacco: Never    Tobacco comments:     quit 23yo   Substance Use Topics    Alcohol use: Yes     Comment: Alcoholic Drinks/day: 3 beer/ week             10/16/2023     8:52 AM   Alcohol Use   Prescreen: >3 drinks/day or >7 drinks/week? No       Last PSA:   Prostate Specific Antigen Screen   Date Value Ref Range Status   04/18/2022 3.92 0.00 - 4.50 ug/L Final       Reviewed orders with patient. Reviewed health maintenance and updated orders accordingly - Yes  Lab work is in process    Reviewed and updated as needed this visit by clinical staff   Tobacco  Allergies  Meds              Reviewed and updated as needed this visit by Provider                 Past Medical History:   Diagnosis Date    Abnormal chest x-ray 01/16/2017    Normal CT  scan    Allergic rhinitis     Arthralgia 01/19/2018    Ascending aorta dilation (H24)     4.4 cm on CT February 2019, stable Feb 2020, CTA stable 4.3 cm April 2022    BPH (benign prostatic hyperplasia)     Cervical radiculopathy 01/01/2003    Left C5-C6 foraminal stenosis with bulging disc and osteophyte complex    Change in bowel movement 01/31/2020    Chest pain 01/01/2006    Normal GXT    Fatigue 11/13/2015    Foreign body in hand, left, subsequent encounter 03/05/2018    Hand surgery 2018    Hypercholesteremia     CT coronary calcium score 102, 50 to 75th percentile.  Recommending starting statin    Mild obstructive sleep apnea 10/23/2023    Osteoarthritis     Personal history of colonic polyps     Colonoscopy November 2012 with polyp.  Colonoscopy March 2019 with multiple polyps including advanced adenoma, repeat in 3 years    Tendinitis, de Quervain's 01/01/2013    Right wrist pain      Past Surgical History:   Procedure Laterality Date    ANKLE FRACTURE SURGERY      APPENDECTOMY      CATARACT EXTRACTION Bilateral     HAND SURGERY  01/01/2018    Removal of large foreign body/splinter    TONSILLECTOMY         Review of Systems   Constitutional:  Negative for chills and fever.   HENT:  Negative for congestion, ear pain, hearing loss and sore throat.    Eyes:  Negative for pain and visual disturbance.   Respiratory:  Negative for cough and shortness of breath.    Cardiovascular:  Negative for chest pain, palpitations and peripheral edema.   Gastrointestinal:  Negative for abdominal pain, constipation, diarrhea, heartburn, hematochezia and nausea.   Genitourinary:  Positive for frequency and urgency. Negative for dysuria, genital sores, hematuria, impotence and penile discharge.   Musculoskeletal:  Negative for arthralgias, joint swelling and myalgias.   Skin:  Negative for rash.   Neurological:  Negative for dizziness, weakness, headaches and paresthesias.   Psychiatric/Behavioral:  Negative for mood changes. The  patient is not nervous/anxious.          OBJECTIVE:   /74 (BP Location: Right arm, Patient Position: Sitting, Cuff Size: Adult Regular)   Pulse 62   Temp 98.2  F (36.8  C) (Oral)   Resp 16   Ht 1.829 m (6')   Wt 89.4 kg (197 lb)   SpO2 96%   BMI 26.72 kg/m      Physical Exam  EYES: Eyelids, conjunctiva, and sclera were normal. Pupils were normal. Cornea, iris, and lens were normal bilaterally.  HEAD, EARS, NOSE, MOUTH, AND THROAT: Head and face were normal. TMs and external auditory canals are normal  NECK: Neck appearance was normal. There were no neck masses and the thyroid was not enlarged and no nodules are felt.  No lymphadenopathy.  RESPIRATORY: Breathing pattern was normal and the chest moved symmetrically.   Lung sounds were normal and there were no rales or wheezes.  CARDIOVASCULAR: Heart rate and rhythm were normal.  S1 and S2 were normal and there were no extra sounds or murmurs. Peripheral pulses in arms and legs were normal.  There was no peripheral edema.  No carotid bruits.  GASTROINTESTINAL:  Bowel sounds were present.   Palpation detected no tenderness, mass, or enlarged organs.   RECTAL/PROSTATE:  deferred, followed by urology  MUSCULOSKELETAL: Skeletal configuration was normal and muscle mass was normal for age. Joint appearance was overall normal.  LYMPHATIC: There were no enlarged nodes.  SKIN/HAIR/NAILS: Skin color was normal.  There were no concerning skin lesions.  NEUROLOGIC: The patient was alert and oriented to person, place, time, and circumstance. Speech was normal. Cranial nerves were normal. Motor strength was normal for age. The patient was normally coordinated.  Sensation intact.  PSYCHIATRIC:  Mood and affect were normal and the patient had normal recent and remote memory. The patient's judgment and insight were normal.         ASSESSMENT/PLAN:   1. Routine general medical examination at a health care facility   Immunizations are reviewed and providing COVID-vaccine and  flu shot.  Also recommending getting the new RSV vaccine, getting Shingrix and he needs his tetanus updated.  He has a living will.  Former smoker quitting regular use over 30 years ago.  Uses alcohol in moderation.  Regular exercise and healthy diet habits to maintain a healthy weight discussed.  Due for a screening colonoscopy after multiple polyps removed in 2019.  He will get this scheduled with [facility].  Prostate exam is deferred but I will check a PSA for prostate cancer screening.  He is seeing a urologist.  Recommending seeing an ophthalmologist every year.  Regular dental visits every 6 months discussed.  Skin exam performed and recommending regular use of sunblock.  We discussed seeing a dermatologist every 2 years for total-body skin exam.  Hepatitis C antibody for screening was normal.  Will screen for diabetes with fasting glucose.     - PSA, screen; Future  - CBC with platelets; Future  - Comprehensive metabolic panel (BMP + Alb, Alk Phos, ALT, AST, Total. Bili, TP); Future  - UA Macroscopic with reflex to Microscopic and Culture - Lab Collect; Future    2. Ascending aorta dilation (H24)  Known thoracic aortic aneurysm last imaged in 2022 demonstrating no significant change compared to 2020.  Asymptomatic.  Blood pressure well controlled.  We will plan to recheck with CTA in 2024.    3. Hypercholesteremia  Rechecking a lipid profile on rosuvastatin.  Tolerating medication without side effects.  - Lipid Profile (Chol, Trig, HDL, LDL calc); Future  - rosuvastatin (CRESTOR) 10 MG tablet; Take 1 tablet (10 mg) by mouth at bedtime  Dispense: 90 tablet; Refill: 3        5. Mild obstructive sleep apnea  Performed home study suggesting some mild sleep apnea but he is interested in pursuing full sleep study evaluation at some point soon but he prefers to complete his work-up regarding his BPH first especially if he needs a procedure    6. Personal history of colonic polyps  Multiple polyps  removed during colonoscopy in 2019 and he is overdue to have a screening colonoscopy completed.  Information provided for him to get that scheduled.    7. Benign prostatic hyperplasia with weak urinary stream  He is being evaluated by urology.  Not a candidate for UroLift.  Currently on Flomax.  Discussing possible TURP  - tamsulosin (FLOMAX) 0.4 MG capsule; Take 1 capsule (0.4 mg) by mouth at bedtime  Dispense: 90 capsule; Refill: 3     8.  Oropharyngeal dysphagia  Over the last several years, he will occasionally have certain food stuck up in his throat and at one point had to cough the food back up.  This is usually with bread or pasta.  It occurs when he is eating faster than he should.  He has tried to modify this behavior and this does seem to be helping.  No history of acid reflux.  He denies any esophageal dysphagia.  We discussed further work-up including esophagram or EGD if symptoms are progressing.    Patient has been advised of split billing requirements and indicates understanding: Yes        BMI:   Estimated body mass index is 26.72 kg/m  as calculated from the following:    Height as of this encounter: 1.829 m (6').    Weight as of this encounter: 89.4 kg (197 lb).         He reports that he has quit smoking. He has been exposed to tobacco smoke. He has never used smokeless tobacco.            Juvencio Carter MD  [facility]

## 2023-10-23 NOTE — PATIENT INSTRUCTIONS
Preventive Health Recommendations  Male Ages 50 - 64    Yearly exam:             See your health care provider every year in order to  o   Review health changes.   o   Discuss preventive care.    o   Review your medicines if your doctor has prescribed any.   Cholesterol annually  Diabetes screening  You are due for a screening colonoscopy.  Please call [facility] at [phone] to schedule  PSA annually for prostate cancer screening  You should be tested each year for STDs (sexually transmitted diseases), if you re at risk.   CTA chest will be repeated in 2024 to follow-up dilated ascending aorta    Shots: Get a flu shot each year.  Recommending new RSV vaccine.  You should also consider getting the shingles vaccine, Shingrix.  You are also due to get a tetanus vaccine updated.  You can schedule a Td vaccine at your pharmacy.    Nutrition:  Eat at least 5 servings of fruits and vegetables daily.   Eat whole-grain bread, whole-wheat pasta and brown rice instead of white grains and rice.   Get adequate Calcium and Vitamin D.     Lifestyle  Exercise for at least 150 minutes a week (30 minutes a day, 5 days a week). This will help you control your weight and prevent disease.   Limit alcohol to one drink per day.   No smoking.   Wear sunscreen to prevent skin cancer.  Consider seeing a dermatologist every 2 years for total-body skin exam  See your dentist every six months for an exam and cleaning.   See your eye doctor every 1 to 2 years.

## 2023-10-24 LAB — BACTERIA UR CULT: NO GROWTH

## 2024-01-09 ENCOUNTER — OFFICE VISIT (OUTPATIENT)
Dept: INTERNAL MEDICINE | Facility: CLINIC | Age: 64
End: 2024-01-09
Payer: COMMERCIAL

## 2024-01-09 VITALS
OXYGEN SATURATION: 95 % | DIASTOLIC BLOOD PRESSURE: 70 MMHG | TEMPERATURE: 98 F | SYSTOLIC BLOOD PRESSURE: 110 MMHG | HEIGHT: 72 IN | HEART RATE: 70 BPM | BODY MASS INDEX: 27.22 KG/M2 | RESPIRATION RATE: 16 BRPM | WEIGHT: 201 LBS

## 2024-01-09 DIAGNOSIS — G47.33 MILD OBSTRUCTIVE SLEEP APNEA: ICD-10-CM

## 2024-01-09 DIAGNOSIS — R33.8 BENIGN PROSTATIC HYPERPLASIA WITH URINARY RETENTION: ICD-10-CM

## 2024-01-09 DIAGNOSIS — Z01.818 PRE-OP EXAM: Primary | ICD-10-CM

## 2024-01-09 DIAGNOSIS — N40.1 BENIGN PROSTATIC HYPERPLASIA WITH URINARY RETENTION: ICD-10-CM

## 2024-01-09 PROBLEM — N40.0 BPH (BENIGN PROSTATIC HYPERPLASIA): Status: ACTIVE | Noted: 2024-01-09

## 2024-01-09 LAB
ATRIAL RATE - MUSE: 68 BPM
DIASTOLIC BLOOD PRESSURE - MUSE: NORMAL MMHG
ERYTHROCYTE [DISTWIDTH] IN BLOOD BY AUTOMATED COUNT: 12.3 % (ref 10–15)
HCT VFR BLD AUTO: 42.1 % (ref 40–53)
HGB BLD-MCNC: 14.2 G/DL (ref 13.3–17.7)
INTERPRETATION ECG - MUSE: NORMAL
MCH RBC QN AUTO: 29.9 PG (ref 26.5–33)
MCHC RBC AUTO-ENTMCNC: 33.7 G/DL (ref 31.5–36.5)
MCV RBC AUTO: 89 FL (ref 78–100)
P AXIS - MUSE: 49 DEGREES
PLATELET # BLD AUTO: 236 10E3/UL (ref 150–450)
PR INTERVAL - MUSE: 168 MS
QRS DURATION - MUSE: 96 MS
QT - MUSE: 406 MS
QTC - MUSE: 431 MS
R AXIS - MUSE: -13 DEGREES
RBC # BLD AUTO: 4.75 10E6/UL (ref 4.4–5.9)
SYSTOLIC BLOOD PRESSURE - MUSE: NORMAL MMHG
T AXIS - MUSE: 13 DEGREES
VENTRICULAR RATE- MUSE: 68 BPM
WBC # BLD AUTO: 7.5 10E3/UL (ref 4–11)

## 2024-01-09 PROCEDURE — 99214 OFFICE O/P EST MOD 30 MIN: CPT | Mod: 25 | Performed by: INTERNAL MEDICINE

## 2024-01-09 PROCEDURE — 85027 COMPLETE CBC AUTOMATED: CPT | Performed by: INTERNAL MEDICINE

## 2024-01-09 PROCEDURE — 93010 ELECTROCARDIOGRAM REPORT: CPT | Performed by: GENERAL ACUTE CARE HOSPITAL

## 2024-01-09 PROCEDURE — 36415 COLL VENOUS BLD VENIPUNCTURE: CPT | Performed by: INTERNAL MEDICINE

## 2024-01-09 PROCEDURE — 93005 ELECTROCARDIOGRAM TRACING: CPT | Performed by: INTERNAL MEDICINE

## 2024-01-09 NOTE — PROGRESS NOTES
[facility]  [address] [place] [id number]  Phone: [phone]  Fax: [phone]  Primary Provider: Juvencio Carter  Pre-op Performing Provider: JUVENCIO CARTER      PREOPERATIVE EVALUATION:  Today's date: 1/9/2024    Slava is a 63 year old, presenting for the following:  Pre-Op Exam        1/9/2024     2:48 PM   Additional Questions   Roomed by Shazia GARCIA       Surgical Information:  Surgery/Procedure: CYSTOSCOPY BIPOLAR RESECTION TRANSURETHRAL PROSTATE   Surgery Location: [facility]  Surgeon: Dr. Ingram  Surgery Date: 1/19/24  Time of Surgery:   Where patient plans to recover: At home with family  Fax number for surgical facility: [phone]    Assessment & Plan     The proposed surgical procedure is considered INTERMEDIATE risk.    Pre-op exam  63-year-old male here for preop clearance prior to upcoming TURP for BPH with urinary retention.  He has tolerated previous surgery and anesthesia without any complications.  Overall risk is low and he is cleared to proceed as planned.  He will avoid aspirin and NSAIDs during the week prior to surgery.  - EKG 12-lead, tracing only  - CBC with platelets; Future  - CBC with platelets    Benign prostatic hyperplasia with urinary retention  Worsening BPH symptoms including urinary retention.  He has failed conservative treatment including Rezum procedure June 2023 and continued use of tamsulosin.  He is needing to self cath multiple times throughout the day.  Plans for TURP are made.    Mild obstructive sleep apnea  He does have mild sleep apnea but does not use CPAP               Antiplatelet or Anticoagulation Medication Instructions:   - Patient is on no antiplatelet or anticoagulation medications.    Additional Medication Instructions:  Avoid aspirin and over-the-counter NSAIDs including ibuprofen, Motrin, Advil and Aleve for 1 week prior to your surgery    You may take Tylenol/acetaminophen during that  week    RECOMMENDATION:  APPROVAL GIVEN to proceed with proposed procedure, without further diagnostic evaluation.    Independent interpretation of a test performed by another physician/other qualified health care professional (not separately reported) - I personally reviewed and interpreted his EKG  Ordering of each unique test CBC and EKG        Subjective       HPI related to upcoming procedure: 63-year-old male to have TURP for BPH with urinary retention.  See assessment and plan for details        1/6/2024     8:08 AM   Preop Questions   1. Have you ever had a heart attack or stroke? No   2. Have you ever had surgery on your heart or blood vessels, such as a stent placement, a coronary artery bypass, or surgery on an artery in your head, neck, heart, or legs? No   3. Do you have chest pain with activity? No   4. Do you have a history of  heart failure? No   5. Do you currently have a cold, bronchitis or symptoms of other infection? No   6. Do you have a cough, shortness of breath, or wheezing? No   7. Do you or anyone in your family have previous history of blood clots? No   8. Do you or does anyone in your family have a serious bleeding problem such as prolonged bleeding following surgeries or cuts? No   9. Have you ever had problems with anemia or been told to take iron pills? No   10. Have you had any abnormal blood loss such as black, tarry or bloody stools? No   11. Have you ever had a blood transfusion? No   12. Are you willing to have a blood transfusion if it is medically needed before, during, or after your surgery? Yes   13. Have you or any of your relatives ever had problems with anesthesia? No   14. Do you have sleep apnea, excessive snoring or daytime drowsiness? YES -    14a. Do you have a CPAP machine? No   15. Do you have any artifical heart valves or other implanted medical devices like a pacemaker, defibrillator, or continuous glucose monitor? No   16. Do you have artificial joints? No   17.  Are you allergic to latex? No       Health Care Directive:  Patient does not have a Health Care Directive or Living Will:       Review of Systems  12 point review of systems is negative other than what is discussed in the assessment and plan    Patient Active Problem List    Diagnosis Date Noted    Mild obstructive sleep apnea 10/23/2023     Priority: Medium    Oropharyngeal dysphagia 10/23/2023     Priority: Medium    Ascending aorta dilation (H24) 05/03/2022     Priority: Medium     4.4 cm on CT February 2019, stable Feb 2020, CTA stable 4.3 cm April 2022      Personal history of colonic polyps      Priority: Medium     Colonoscopy November 2012 with polyp.  Colonoscopy March 2019 with   multiple polyps including advanced adenoma, repeat in 3 years        Hypercholesteremia      Priority: Medium     CT coronary calcium score 102, 50 to 75th percentile.  Recommending   starting statin        Allergic rhinitis      Priority: Medium    BPH (benign prostatic hyperplasia)      Priority: Medium    Osteoarthritis      Priority: Medium      Past Medical History:   Diagnosis Date    Abnormal chest x-ray 01/16/2017    Normal CT scan    Allergic rhinitis     Arthralgia 01/19/2018    Ascending aorta dilation (H24)     4.4 cm on CT February 2019, stable Feb 2020, CTA stable 4.3 cm April 2022    BPH (benign prostatic hyperplasia)     Cervical radiculopathy 01/01/2003    Left C5-C6 foraminal stenosis with bulging disc and osteophyte complex    Change in bowel movement 01/31/2020    Chest pain 01/01/2006    Normal GXT    Fatigue 11/13/2015    Foreign body in hand, left, subsequent encounter 03/05/2018    Hand surgery 2018    Hypercholesteremia     CT coronary calcium score 102, 50 to 75th percentile.  Recommending starting statin    Mild obstructive sleep apnea 10/23/2023    Oropharyngeal dysphagia 10/23/2023    Osteoarthritis     Personal history of colonic polyps     Colonoscopy November 2012 with polyp.  Colonoscopy March 2019  with multiple polyps including advanced adenoma, repeat in 3 years    Tendinitis, de Quervain's 01/01/2013    Right wrist pain     Past Surgical History:   Procedure Laterality Date    ANKLE FRACTURE SURGERY      APPENDECTOMY      CATARACT EXTRACTION Bilateral     HAND SURGERY  01/01/2018    Removal of large foreign body/splinter    TONSILLECTOMY       Current Outpatient Medications   Medication Sig Dispense Refill    rosuvastatin (CRESTOR) 10 MG tablet Take 1 tablet (10 mg) by mouth at bedtime 90 tablet 3    tamsulosin (FLOMAX) 0.4 MG capsule Take 1 capsule (0.4 mg) by mouth at bedtime 90 capsule 3       No Known Allergies     Social History     Tobacco Use    Smoking status: Former     Passive exposure: Past    Smokeless tobacco: Never    Tobacco comments:     quit 25yo   Substance Use Topics    Alcohol use: Yes     Comment: Alcoholic Drinks/day: 3 beer/ week     Family History   Problem Relation Age of Onset    Dementia Mother         d 98    Breast Cancer Mother     Diabetes Type 2  Brother     Dementia Brother      History   Drug Use Not on file         Objective     /70 (BP Location: Right arm, Patient Position: Sitting, Cuff Size: Adult Regular)   Pulse 70   Temp 98  F (36.7  C) (Oral)   Resp 16   Ht 1.829 m (6')   Wt 91.2 kg (201 lb)   SpO2 95%   BMI 27.26 kg/m      Physical Exam  GENERAL APPEARANCE: healthy, alert and no distress  HENT: Normal  RESP: lungs clear to auscultation - no rales, rhonchi or wheezes  CV: regular rate and rhythm, normal S1 S2, no S3 or S4 and no murmur, click or rub.  No carotid bruits  ABDOMEN: soft, nontender, no HSM or masses and bowel sounds normal  NEURO: Normal strength and tone, sensory exam grossly normal, mentation intact and speech normal    Recent Labs   Lab Test 10/23/23  1453 04/18/22  1519   HGB 14.4 14.7    205    139   POTASSIUM 4.0 4.4   CR 0.88 0.82        Diagnostics:  Labs-  Hemoglobin 14.2 platelet 236     EKG: Normal sinus rhythm, rate  68.  No significant ST or T wave abnormality    Revised Cardiac Risk Index (RCRI):  The patient has the following serious cardiovascular risks for perioperative complications:   - No serious cardiac risks = 0 points     RCRI Interpretation: 0 points: Class I (very low risk - 0.4% complication rate)         Signed Electronically by: Juvencio Carter MD  Copy of this evaluation report is provided to requesting physician.

## 2024-01-09 NOTE — PATIENT INSTRUCTIONS
Preparing for Your Surgery  Getting started  A nurse will call you to review your health history and instructions. They will give you an arrival time based on your scheduled surgery time. Please be ready to share:  Your doctor's clinic name and phone number  Your medical, surgical, and anesthesia history  A list of allergies and sensitivities  A list of medicines, including herbal treatments and over-the-counter drugs  Whether the patient has a legal guardian (ask how to send us the papers in advance)  Please tell us if you're pregnant--or if there's any chance you might be pregnant. Some surgeries may injure a fetus (unborn baby), so they require a pregnancy test. Surgeries that are safe for a fetus don't always need a test, and you can choose whether to have one.   If you have a child who's having surgery, please ask for a copy of Preparing for Your Child's Surgery.    Preparing for surgery  Within 10 to 30 days of surgery: Have a pre-op exam (sometimes called an H&P, or History and Physical). This can be done at a clinic or pre-operative center.  If you're having a , you may not need this exam. Talk to your care team.  At your pre-op exam, talk to your care team about all medicines you take. If you need to stop any medicines before surgery, ask when to start taking them again.  We do this for your safety. Many medicines can make you bleed too much during surgery. Some change how well surgery (anesthesia) drugs work.  Call your insurance company to let them know you're having surgery. (If you don't have insurance, call [phone].)  Call your clinic if there's any change in your health. This includes signs of a cold or flu (sore throat, runny nose, cough, rash, fever). It also includes a scrape or scratch near the surgery site.  If you have questions on the day of surgery, call your hospital or surgery center.  Eating and drinking guidelines  For your safety: Unless your surgeon tells you otherwise,  follow the guidelines below.  Eat and drink as usual until 8 hours before you arrive for surgery. After that, no food or milk.  Drink clear liquids until 2 hours before you arrive. These are liquids you can see through, like water, Gatorade, and Propel Water. They also include plain black coffee and tea (no cream or milk), candy, and breath mints. You can spit out gum when you arrive.  If you drink alcohol: Stop drinking it the night before surgery.  If your care team tells you to take medicine on the morning of surgery, it's okay to take it with a sip of water.  Preventing infection  Shower or bathe the night before and morning of your surgery. Follow the instructions your clinic gave you. (If no instructions, use regular soap.)  Don't shave or clip hair near your surgery site. We'll remove the hair if needed.  Don't smoke or vape the morning of surgery. You may chew nicotine gum up to 2 hours before surgery. A nicotine patch is okay.  Note: Some surgeries require you to completely quit smoking and nicotine. Check with your surgeon.  Your care team will make every effort to keep you safe from infection. We will:  Clean our hands often with soap and water (or an alcohol-based hand rub).  Clean the skin at your surgery site with a special soap that kills germs.  Give you a special gown to keep you warm. (Cold raises the risk of infection.)  Wear special hair covers, masks, gowns and gloves during surgery.  Give antibiotic medicine, if prescribed. Not all surgeries need antibiotics.  What to bring on the day of surgery  Photo ID and insurance card  Copy of your health care directive, if you have one  Glasses and hearing aids (bring cases)  You can't wear contacts during surgery  Inhaler and eye drops, if you use them (tell us about these when you arrive)  CPAP machine or breathing device, if you use them  A few personal items, if spending the night  If you have . . .  A pacemaker, ICD (cardiac defibrillator) or other  implant: Bring the ID card.  An implanted stimulator: Bring the remote control.  A legal guardian: Bring a copy of the certified (court-stamped) guardianship papers.  Please remove any jewelry, including body piercings. Leave jewelry and other valuables at home.  If you're going home the day of surgery  You must have a responsible adult drive you home. They should stay with you overnight as well.  If you don't have someone to stay with you, and you aren't safe to go home alone, we may keep you overnight. Insurance often won't pay for this.  After surgery  If it's hard to control your pain or you need more pain medicine, please call your surgeon's office.  Questions?   If you have any questions for your care team, list them here: _________________________________________________________________________________________________________________________________________________________________________ ____________________________________ ____________________________________ ____________________________________  For informational purposes only. Not to replace the advice of your health care provider. Copyright   2003, 2019 [employer]. All rights reserved. Clinically reviewed by Tara Ramesh MD. [employer] 800096 - REV 12/22.    How to Take Your Medication Before Surgery    Avoid aspirin and over-the-counter NSAIDs including ibuprofen, Motrin, Advil and Aleve for 1 week prior to your surgery    You may take Tylenol/acetaminophen during that week

## 2024-01-25 ENCOUNTER — PATIENT OUTREACH (OUTPATIENT)
Dept: GASTROENTEROLOGY | Facility: CLINIC | Age: 64
End: 2024-01-25
Payer: COMMERCIAL

## 2024-01-25 DIAGNOSIS — Z12.11 SPECIAL SCREENING FOR MALIGNANT NEOPLASMS, COLON: Primary | ICD-10-CM

## 2024-01-25 NOTE — PROGRESS NOTES
Patient has a history of polyps and surveillance colonoscopy is overdue. Patient meets criteria for CRC high risk standing order protocol.    CRC Screening Colonoscopy Referral Review    Patient meets the inclusion criteria for screening colonoscopy standing order.    Ordering/Referring Provider:  Juvencio Carter MD    BMI: Estimated body mass index is 27.26 kg/m  as calculated from the following:    Height as of 1/9/24: 1.829 m (6').    Weight as of 1/9/24: 91.2 kg (201 lb).     Sedation:  Does patient have any of the following conditions affecting sedation?  No medical conditions affecting sedation.    Previous Scopes:  Any previous recommendations or follow up needs based on previous scope?  na / No recommendations.    Medical Concerns to Postpone Order:  Does patient have any of the following medical concerns that should postpone/delay colonoscopy referral?  No medical conditions affecting colonoscopy referral.    Final Referral Details:  Based on patient's medical history patient is appropriate for referral order with moderate sedation. If patient's BMI > 50 do not schedule in [facility].

## 2024-02-14 ENCOUNTER — TELEPHONE (OUTPATIENT)
Dept: GASTROENTEROLOGY | Facility: CLINIC | Age: 64
End: 2024-02-14
Payer: COMMERCIAL

## 2024-02-14 NOTE — TELEPHONE ENCOUNTER
"Endoscopy Scheduling Screen    Have you had a positive Covid test in the last 14 days?  No    Are you active on MyChart?   Yes    What insurance is in the chart?  Other:  [employer]    Ordering/Referring Provider: DANIELITO RIVERA   (If ordering provider performs procedure, schedule with ordering provider unless otherwise instructed. )    BMI: Estimated body mass index is 27.26 kg/m  as calculated from the following:    Height as of 1/9/24: 1.829 m (6').    Weight as of 1/9/24: 91.2 kg (201 lb).     Sedation Ordered  moderate sedation.   If patient BMI > 50 do not schedule in [facility].    If patient BMI > 45 do not schedule at [facility].    Are you taking methadone or Suboxone?  No    Have you had difficulties, pain, or discomfort during past endoscopy procedures?  No    Are you taking any prescription medications for pain 3 or more times per week?   NO - No RN review required.    Do you have a history of malignant hyperthermia or adverse reaction to anesthesia?  No    (Females) Are you currently pregnant?   No     Have you been diagnosed or told you have pulmonary hypertension?   No    Do you have an LVAD?  No    Have you been told you have moderate to severe sleep apnea?  No    Have you been told you have COPD, asthma, or any other lung disease?  No    Do you have any heart conditions?  No     Have you ever had an organ transplant?   No    Have you ever had or are you awaiting a heart or lung transplant?   No    Have you had a stroke or transient ischemic attack (TIA aka \"mini stroke\" in the last 6 months?   No    Have you been diagnosed with or been told you have cirrhosis of the liver?   No    Are you currently on dialysis?   No    Do you need assistance transferring?   No    BMI: Estimated body mass index is 27.26 kg/m  as calculated from the following:    Height as of 1/9/24: 1.829 m (6').    Weight as of 1/9/24: 91.2 kg (201 lb).     Is patients BMI > 40 and scheduling location [facility]?  No    Do you take an " injectable medication for weight loss or diabetes (excluding insulin)?  No    Do you take the medication Naltrexone?  No    Do you take blood thinners?  No       Prep   Are you currently on dialysis or do you have chronic kidney disease?  No    Do you have a diagnosis of diabetes?  No    Do you have a diagnosis of cystic fibrosis (CF)?  No    On a regular basis do you go 3 -5 days between bowel movements?  No    BMI > 40?  No    Preferred Pharmacy:    [id number] - [place], [place] - [address] [facility] [address]  [address]  [place] [address]  Phone: [phone] Fax: [phone]      Final Scheduling Details   Colonoscopy prep sent?  N/A    Procedure scheduled  Colonoscopy    Surgeon:  GEORGES     Date of procedure:  5/3/24     Pre-OP / PAC:   No - Not required for this site.    Location  [facility] - Patient preference.    Sedation   Moderate Sedation - Per order.      Patient Reminders:   You will receive a call from a Nurse to review instructions and health history.  This assessment must be completed prior to your procedure.  Failure to complete the Nurse assessment may result in the procedure being cancelled.      On the day of your procedure, please designate an adult(s) who can drive you home stay with you for the next 24 hours. The medicines used in the exam will make you sleepy. You will not be able to drive.      You cannot take public transportation, ride share services, or non-medical taxi service without a responsible caregiver.  Medical transport services are allowed with the requirement that a responsible caregiver will receive you at your destination.  We require that drivers and caregivers are confirmed prior to your procedure.

## 2024-02-15 DIAGNOSIS — R13.19 ESOPHAGEAL DYSPHAGIA: Primary | ICD-10-CM

## 2024-02-22 ENCOUNTER — TRANSFERRED RECORDS (OUTPATIENT)
Dept: HEALTH INFORMATION MANAGEMENT | Facility: CLINIC | Age: 64
End: 2024-02-22
Payer: COMMERCIAL

## 2024-02-23 ENCOUNTER — TELEPHONE (OUTPATIENT)
Dept: GASTROENTEROLOGY | Facility: CLINIC | Age: 64
End: 2024-02-23
Payer: COMMERCIAL

## 2024-02-23 NOTE — TELEPHONE ENCOUNTER
Screening questions completed 2/14, patient states no changes. Colonoscopy scheduled on 5/3, new order for EGD received 2/15. Rescheduled Colonoscopy to 5/21 to add EGD.      Final Scheduling Details   Colonoscopy prep sent?  Standard MiraLAX    Procedure scheduled  Colonoscopy / Upper endoscopy (EGD)    Surgeon:  RINKU     Date of procedure:  5/21     Pre-OP / PAC:   No - Not required for this site.    Location  [facility] - Patient preference.    Sedation   Moderate Sedation - Per order.      Patient Reminders:   You will receive a call from a Nurse to review instructions and health history.  This assessment must be completed prior to your procedure.  Failure to complete the Nurse assessment may result in the procedure being cancelled.      On the day of your procedure, please designate an adult(s) who can drive you home stay with you for the next 24 hours. The medicines used in the exam will make you sleepy. You will not be able to drive.      You cannot take public transportation, ride share services, or non-medical taxi service without a responsible caregiver.  Medical transport services are allowed with the requirement that a responsible caregiver will receive you at your destination.  We require that drivers and caregivers are confirmed prior to your procedure.

## 2024-05-08 ENCOUNTER — TELEPHONE (OUTPATIENT)
Dept: GASTROENTEROLOGY | Facility: CLINIC | Age: 64
End: 2024-05-08
Payer: COMMERCIAL

## 2024-05-08 NOTE — TELEPHONE ENCOUNTER
Pre visit planning completed.      Procedure details:    Patient scheduled for Colonoscopy/Upper endoscopy (EGD) on 05.21.2024. Email sent to endo scheduling to ensure this is scheduled correctly d/t not showing up on schedule as both.     Arrival time: 1200. Procedure time 1245    Facility location: [facility]; [facility] [address] [place] [address]. Check in location: Main entrance at . Come through the roundabout underneath the awning (not the ER entrance).    Sedation type: Conscious sedation     Pre op exam needed? N/A    Indication for procedure: Esophageal dysphagia, Special screening for malignant neoplasms, colon       Chart review:     Electronic implanted devices? No    Recent diagnosis of diverticulitis within the last 6 weeks? No    Diabetic? No      Medication review:    Anticoagulants? No    NSAIDS? No NSAID medications per patient's medication list.  RN will verify with pre-assessment call.    Other medication HOLDING recommendations:  N/A      Prep for procedure:     Bowel prep recommendation: Standard Miralax  Due to: standard bowel prep.    Prep instructions sent via [employer] Send after finding out if scheduled correctly.         Marcela Cline RN  Endoscopy Procedure Pre Assessment RN  [phone] option 4

## 2024-05-14 NOTE — TELEPHONE ENCOUNTER
Pre assessment completed for upcoming procedure.   (Please see previous telephone encounter notes for complete details)        Procedure details:    Arrival time and facility location reviewed.    Pre op exam needed? N/A    Designated  policy reviewed. Instructed to have someone stay 6  hours post procedure.       Medication review:    Medications reviewed. Please see supporting documentation below. Holding recommendations discussed (if applicable).   N/A      Prep for procedure:     Procedure prep instructions reviewed.        Any additional information needed:  N/A      Patient  verbalized understanding and had no questions or concerns at this time.      Marcela Cline RN  Endoscopy Procedure Pre Assessment RN  [phone] option 4

## 2024-05-21 ENCOUNTER — HOSPITAL ENCOUNTER (OUTPATIENT)
Facility: CLINIC | Age: 64
Discharge: HOME | End: 2024-05-21
Attending: INTERNAL MEDICINE | Admitting: INTERNAL MEDICINE
Payer: COMMERCIAL

## 2024-05-21 VITALS
WEIGHT: 190 LBS | DIASTOLIC BLOOD PRESSURE: 87 MMHG | HEART RATE: 59 BPM | OXYGEN SATURATION: 95 % | SYSTOLIC BLOOD PRESSURE: 118 MMHG | BODY MASS INDEX: 25.73 KG/M2 | HEIGHT: 72 IN | RESPIRATION RATE: 16 BRPM

## 2024-05-21 LAB
COLONOSCOPY: NORMAL
UPPER GI ENDOSCOPY: NORMAL

## 2024-05-21 PROCEDURE — G0500 MOD SEDAT ENDO SERVICE >5YRS: HCPCS | Performed by: INTERNAL MEDICINE

## 2024-05-21 PROCEDURE — 250N000011 HC RX IP 250 OP 636: Performed by: INTERNAL MEDICINE

## 2024-05-21 PROCEDURE — 99153 MOD SED SAME PHYS/QHP EA: CPT | Performed by: INTERNAL MEDICINE

## 2024-05-21 PROCEDURE — G0105 COLORECTAL SCRN; HI RISK IND: HCPCS | Performed by: INTERNAL MEDICINE

## 2024-05-21 PROCEDURE — 999N000099 HC STATISTIC MODERATE SEDATION < 10 MIN: Performed by: INTERNAL MEDICINE

## 2024-05-21 PROCEDURE — 88305 TISSUE EXAM BY PATHOLOGIST: CPT | Mod: TC | Performed by: INTERNAL MEDICINE

## 2024-05-21 PROCEDURE — 250N000009 HC RX 250: Performed by: INTERNAL MEDICINE

## 2024-05-21 PROCEDURE — 43239 EGD BIOPSY SINGLE/MULTIPLE: CPT | Performed by: INTERNAL MEDICINE

## 2024-05-21 PROCEDURE — 45378 DIAGNOSTIC COLONOSCOPY: CPT | Performed by: INTERNAL MEDICINE

## 2024-05-21 RX ORDER — PROCHLORPERAZINE MALEATE 5 MG
5 TABLET ORAL EVERY 6 HOURS PRN
Status: DISCONTINUED | OUTPATIENT
Start: 2024-05-21 | End: 2024-05-21 | Stop reason: HOSPADM

## 2024-05-21 RX ORDER — FLUMAZENIL 0.1 MG/ML
0.2 INJECTION, SOLUTION INTRAVENOUS
Status: DISCONTINUED | OUTPATIENT
Start: 2024-05-21 | End: 2024-05-21 | Stop reason: HOSPADM

## 2024-05-21 RX ORDER — SIMETHICONE 40MG/0.6ML
133 SUSPENSION, DROPS(FINAL DOSAGE FORM)(ML) ORAL
Status: DISCONTINUED | OUTPATIENT
Start: 2024-05-21 | End: 2024-05-21 | Stop reason: HOSPADM

## 2024-05-21 RX ORDER — LIDOCAINE 40 MG/G
CREAM TOPICAL
Status: DISCONTINUED | OUTPATIENT
Start: 2024-05-21 | End: 2024-05-21 | Stop reason: HOSPADM

## 2024-05-21 RX ORDER — DIPHENHYDRAMINE HYDROCHLORIDE 50 MG/ML
25-50 INJECTION INTRAMUSCULAR; INTRAVENOUS
Status: DISCONTINUED | OUTPATIENT
Start: 2024-05-21 | End: 2024-05-21 | Stop reason: HOSPADM

## 2024-05-21 RX ORDER — FENTANYL CITRATE 50 UG/ML
50-100 INJECTION, SOLUTION INTRAMUSCULAR; INTRAVENOUS EVERY 5 MIN PRN
Status: DISCONTINUED | OUTPATIENT
Start: 2024-05-21 | End: 2024-05-21 | Stop reason: HOSPADM

## 2024-05-21 RX ORDER — ONDANSETRON 2 MG/ML
4 INJECTION INTRAMUSCULAR; INTRAVENOUS
Status: DISCONTINUED | OUTPATIENT
Start: 2024-05-21 | End: 2024-05-21 | Stop reason: HOSPADM

## 2024-05-21 RX ORDER — ONDANSETRON 2 MG/ML
4 INJECTION INTRAMUSCULAR; INTRAVENOUS EVERY 6 HOURS PRN
Status: DISCONTINUED | OUTPATIENT
Start: 2024-05-21 | End: 2024-05-21 | Stop reason: HOSPADM

## 2024-05-21 RX ORDER — ATROPINE SULFATE 0.1 MG/ML
1 INJECTION INTRAVENOUS
Status: DISCONTINUED | OUTPATIENT
Start: 2024-05-21 | End: 2024-05-21 | Stop reason: HOSPADM

## 2024-05-21 RX ORDER — ONDANSETRON 4 MG/1
4 TABLET, ORALLY DISINTEGRATING ORAL EVERY 6 HOURS PRN
Status: DISCONTINUED | OUTPATIENT
Start: 2024-05-21 | End: 2024-05-21 | Stop reason: HOSPADM

## 2024-05-21 RX ORDER — EPINEPHRINE 1 MG/ML
0.1 INJECTION, SOLUTION INTRAMUSCULAR; SUBCUTANEOUS
Status: DISCONTINUED | OUTPATIENT
Start: 2024-05-21 | End: 2024-05-21 | Stop reason: HOSPADM

## 2024-05-21 RX ORDER — NALOXONE HYDROCHLORIDE 0.4 MG/ML
0.4 INJECTION, SOLUTION INTRAMUSCULAR; INTRAVENOUS; SUBCUTANEOUS
Status: DISCONTINUED | OUTPATIENT
Start: 2024-05-21 | End: 2024-05-21 | Stop reason: HOSPADM

## 2024-05-21 RX ORDER — NALOXONE HYDROCHLORIDE 0.4 MG/ML
0.2 INJECTION, SOLUTION INTRAMUSCULAR; INTRAVENOUS; SUBCUTANEOUS
Status: DISCONTINUED | OUTPATIENT
Start: 2024-05-21 | End: 2024-05-21 | Stop reason: HOSPADM

## 2024-05-21 RX ADMIN — FENTANYL CITRATE 100 MCG: 50 INJECTION, SOLUTION INTRAMUSCULAR; INTRAVENOUS at 12:24

## 2024-05-21 RX ADMIN — MIDAZOLAM 2 MG: 1 INJECTION INTRAMUSCULAR; INTRAVENOUS at 12:05

## 2024-05-21 RX ADMIN — FENTANYL CITRATE 100 MCG: 50 INJECTION, SOLUTION INTRAMUSCULAR; INTRAVENOUS at 12:03

## 2024-05-21 RX ADMIN — TOPICAL ANESTHETIC 0.5 ML: 200 SPRAY DENTAL; PERIODONTAL at 12:06

## 2024-05-21 RX ADMIN — MIDAZOLAM 2 MG: 1 INJECTION INTRAMUSCULAR; INTRAVENOUS at 12:23

## 2024-05-21 ASSESSMENT — ACTIVITIES OF DAILY LIVING (ADL)
ADLS_ACUITY_SCORE: 35

## 2024-05-21 NOTE — H&P
Pre-Endoscopy History and Physical     Ge Kiran MRN# [id number]   YOB: 1960 Age: 63 year old     Date of Procedure: 5/21/2024  Primary care provider: Juvencio Carter  Type of Endoscopy: Colonoscopy with possible biopsy, possible polypectomy and Gastroscopy with possible biopsy, possible dilation  Reason for Procedure: polyps,dysphagia  Type of Anesthesia Anticipated: Conscious Sedation    HPI:    Ge is a 63 year old male who will be undergoing the above procedure.      A history and physical has been performed. The patient's medications and allergies have been reviewed. The risks and benefits of the procedure and the sedation options and risks were discussed with the patient.  All questions were answered and informed consent was obtained.      He denies a personal or family history of anesthesia complications or bleeding disorders.     Patient Active Problem List   Diagnosis    Allergic rhinitis    Osteoarthritis    Hypercholesteremia    Personal history of colonic polyps    Ascending aorta dilation (H24)    Mild obstructive sleep apnea    Oropharyngeal dysphagia    BPH (benign prostatic hyperplasia)        Past Medical History:   Diagnosis Date    Abnormal chest x-ray 01/16/2017    Normal CT scan    Allergic rhinitis     Arthralgia 01/19/2018    Ascending aorta dilation (H24)     4.4 cm on CT February 2019, stable Feb 2020, CTA stable 4.3 cm April 2022    BPH (benign prostatic hyperplasia)     Rezum procedure June 2023 unsuccessful.  Needing to self cath despite tamsulosin with plans for TURP    Cervical radiculopathy 01/01/2003    Left C5-C6 foraminal stenosis with bulging disc and osteophyte complex    Change in bowel movement 01/31/2020    Chest pain 01/01/2006    Normal GXT    Fatigue 11/13/2015    Foreign body in hand, left, subsequent encounter 03/05/2018    Hand surgery 2018    Hypercholesteremia     CT coronary calcium score 102, 50 to 75th percentile.  Recommending starting  statin    Mild obstructive sleep apnea 10/23/2023    Oropharyngeal dysphagia 10/23/2023    Osteoarthritis     Personal history of colonic polyps     Colonoscopy November 2012 with polyp.  Colonoscopy March 2019 with multiple polyps including advanced adenoma, repeat in 3 years    Tendinitis, de Quervain's 01/01/2013    Right wrist pain        Past Surgical History:   Procedure Laterality Date    ANKLE FRACTURE SURGERY      APPENDECTOMY      CATARACT EXTRACTION Bilateral     HAND SURGERY  01/01/2018    Removal of large foreign body/splinter    TONSILLECTOMY         Social History     Tobacco Use    Smoking status: Former     Passive exposure: Past    Smokeless tobacco: Never    Tobacco comments:     quit 23yo   Substance Use Topics    Alcohol use: Yes     Comment: Alcoholic Drinks/day: 3 beer/ week       Family History   Problem Relation Age of Onset    Dementia Mother         d 98    Breast Cancer Mother     Diabetes Type 2  Brother     Dementia Brother        Prior to Admission medications    Medication Sig Start Date End Date Taking? Authorizing Provider   rosuvastatin (CRESTOR) 10 MG tablet Take 1 tablet (10 mg) by mouth at bedtime 10/23/23   Juvencio Carter MD   tamsulosin (FLOMAX) 0.4 MG capsule Take 1 capsule (0.4 mg) by mouth at bedtime 10/23/23   Juvencio Carter MD       No Known Allergies     REVIEW OF SYSTEMS:   5 point ROS negative except as noted above in HPI, including Gen., Resp., CV, GI &  system review.    PHYSICAL EXAM:   There were no vitals taken for this visit. Estimated body mass index is 27.26 kg/m  as calculated from the following:    Height as of 1/9/24: 1.829 m (6').    Weight as of 1/9/24: 91.2 kg (201 lb).   GENERAL APPEARANCE: alert, and oriented  MENTAL STATUS: alert  AIRWAY EXAM: Mallampatti Class I (visualization of the soft palate, fauces, uvula, anterior and posterior pillars)  RESP: lungs clear to auscultation - no rales, rhonchi or wheezes  CV: regular rates and  rhythm  DIAGNOSTICS:    Not indicated    IMPRESSION   ASA Class 2 - Mild systemic disease    PLAN:   Plan for Colonoscopy with possible biopsy, possible polypectomy and Gastroscopy with possible biopsy, possible dilation. We discussed the risks, benefits and alternatives and the patient wished to proceed.    The above has been forwarded to the consulting provider.      Signed Electronically by: Ge Matt MD  May 21, 2024

## 2024-05-22 LAB
PATH REPORT.COMMENTS IMP SPEC: NORMAL
PATH REPORT.COMMENTS IMP SPEC: NORMAL
PATH REPORT.FINAL DX SPEC: NORMAL
PATH REPORT.GROSS SPEC: NORMAL
PATH REPORT.MICROSCOPIC SPEC OTHER STN: NORMAL
PATH REPORT.RELEVANT HX SPEC: NORMAL
PHOTO IMAGE: NORMAL

## 2024-05-22 PROCEDURE — 88305 TISSUE EXAM BY PATHOLOGIST: CPT | Mod: 26 | Performed by: PATHOLOGY

## 2024-06-11 ASSESSMENT — ACTIVITIES OF DAILY LIVING (ADL)
HOW_WOULD_YOU_RATE_THE_OVERALL_FUNCTION_OF_YOUR_KNEE_DURING_YOUR_USUAL_DAILY_ACTIVITIES?: ABNORMAL
SQUAT: ACTIVITY IS FAIRLY DIFFICULT
KNEEL ON THE FRONT OF YOUR KNEE: ACTIVITY IS FAIRLY DIFFICULT
GO UP STAIRS: ACTIVITY IS SOMEWHAT DIFFICULT
STIFFNESS: THE SYMPTOM AFFECTS MY ACTIVITY MODERATELY
AS_A_RESULT_OF_YOUR_KNEE_INJURY,_HOW_WOULD_YOU_RATE_YOUR_CURRENT_LEVEL_OF_DAILY_ACTIVITY?: ABNORMAL
SQUAT: ACTIVITY IS FAIRLY DIFFICULT
GO DOWN STAIRS: ACTIVITY IS MINIMALLY DIFFICULT
GIVING WAY, BUCKLING OR SHIFTING OF KNEE: I DO NOT HAVE THE SYMPTOM
PAIN: THE SYMPTOM AFFECTS MY ACTIVITY MODERATELY
WALK: ACTIVITY IS MINIMALLY DIFFICULT
AS_A_RESULT_OF_YOUR_KNEE_INJURY,_HOW_WOULD_YOU_RATE_YOUR_CURRENT_LEVEL_OF_DAILY_ACTIVITY?: ABNORMAL
SIT WITH YOUR KNEE BENT: ACTIVITY IS NOT DIFFICULT
RISE FROM A CHAIR: ACTIVITY IS MINIMALLY DIFFICULT
STAND: ACTIVITY IS MINIMALLY DIFFICULT
KNEE_ACTIVITY_OF_DAILY_LIVING_SCORE: 70
HOW_WOULD_YOU_RATE_THE_CURRENT_FUNCTION_OF_YOUR_KNEE_DURING_YOUR_USUAL_DAILY_ACTIVITIES_ON_A_SCALE_FROM_0_TO_100_WITH_100_BEING_YOUR_LEVEL_OF_KNEE_FUNCTION_PRIOR_TO_YOUR_INJURY_AND_0_BEING_THE_INABILITY_TO_PERFORM_ANY_OF_YOUR_USUAL_DAILY_ACTIVITIES?: 60
GIVING WAY, BUCKLING OR SHIFTING OF KNEE: I DO NOT HAVE THE SYMPTOM
SWELLING: I DO NOT HAVE THE SYMPTOM
HOW_WOULD_YOU_RATE_THE_OVERALL_FUNCTION_OF_YOUR_KNEE_DURING_YOUR_USUAL_DAILY_ACTIVITIES?: ABNORMAL
RAW_SCORE: 49
PAIN: THE SYMPTOM AFFECTS MY ACTIVITY MODERATELY
KNEE_ACTIVITY_OF_DAILY_LIVING_SUM: 49
LIMPING: I DO NOT HAVE THE SYMPTOM
STAND: ACTIVITY IS MINIMALLY DIFFICULT
SWELLING: I DO NOT HAVE THE SYMPTOM
WEAKNESS: THE SYMPTOM AFFECTS MY ACTIVITY MODERATELY
HOW_WOULD_YOU_RATE_THE_CURRENT_FUNCTION_OF_YOUR_KNEE_DURING_YOUR_USUAL_DAILY_ACTIVITIES_ON_A_SCALE_FROM_0_TO_100_WITH_100_BEING_YOUR_LEVEL_OF_KNEE_FUNCTION_PRIOR_TO_YOUR_INJURY_AND_0_BEING_THE_INABILITY_TO_PERFORM_ANY_OF_YOUR_USUAL_DAILY_ACTIVITIES?: 60
STIFFNESS: THE SYMPTOM AFFECTS MY ACTIVITY MODERATELY
SIT WITH YOUR KNEE BENT: ACTIVITY IS NOT DIFFICULT
WALK: ACTIVITY IS MINIMALLY DIFFICULT
LIMPING: I DO NOT HAVE THE SYMPTOM
RISE FROM A CHAIR: ACTIVITY IS MINIMALLY DIFFICULT
GO DOWN STAIRS: ACTIVITY IS MINIMALLY DIFFICULT
PLEASE_INDICATE_YOR_PRIMARY_REASON_FOR_REFERRAL_TO_THERAPY:: KNEE
GO UP STAIRS: ACTIVITY IS SOMEWHAT DIFFICULT
KNEEL ON THE FRONT OF YOUR KNEE: ACTIVITY IS FAIRLY DIFFICULT
WEAKNESS: THE SYMPTOM AFFECTS MY ACTIVITY MODERATELY

## 2024-06-12 ENCOUNTER — THERAPY VISIT (OUTPATIENT)
Dept: PHYSICAL THERAPY | Facility: CLINIC | Age: 64
End: 2024-06-12
Payer: COMMERCIAL

## 2024-06-12 DIAGNOSIS — M25.562 ACUTE PAIN OF LEFT KNEE: Primary | ICD-10-CM

## 2024-06-12 PROCEDURE — 97010 HOT OR COLD PACKS THERAPY: CPT | Mod: GP | Performed by: PHYSICAL THERAPIST

## 2024-06-12 PROCEDURE — 97110 THERAPEUTIC EXERCISES: CPT | Mod: GP | Performed by: PHYSICAL THERAPIST

## 2024-06-12 PROCEDURE — 97161 PT EVAL LOW COMPLEX 20 MIN: CPT | Mod: GP | Performed by: PHYSICAL THERAPIST

## 2024-06-12 NOTE — PROGRESS NOTES
PHYSICAL THERAPY EVALUATION  Type of Visit: Evaluation    See electronic medical record for Abuse and Falls Screening details.    Subjective     Pt describes intermittent left knee pain and stiffness.  Began as stiffness about 2 years ago and has progressively worsened.  The location of the pain is variable.  Most frequent in the post knee and hamstring region.  Felt with stairs, intermittently with walking, squatting.  Also reports tightness of the left hip with intermittent clicking.  No imaging done.  No prior rx.  No past knee injuries.         Presenting condition or subjective complaint: Stiff amd weak left need along with pain in thigh and near knee  Date of onset:      Relevant medical history:     Dates & types of surgery: 1/19/24 Turp.   Cataract surgery,    Prior diagnostic imaging/testing results:       Prior therapy history for the same diagnosis, illness or injury: No      Prior Level of Function  Transfers: Independent  Ambulation: Independent  ADL: Independent  IADL:     Living Environment  Social support: With a significant other or spouse   Type of home: House   Stairs to enter the home: Yes 2 Is there a railing: Yes   Ramp: No   Stairs inside the home: Yes 15 Is there a railing: Yes   Help at home: None  Equipment owned:       Employment: No    Hobbies/Interests: Riding horses,  walking,    Patient goals for therapy: Stoop, squat,  take longer walks    Pain assessment: See objective evaluation for additional pain details     Objective   KNEE EVALUATION  PAIN: Pain Level at Rest: 0/10  Pain Level with Use: 5/10  Pain Location: medial knee, post knee, lat knee, lat thigh  Pain is Exacerbated By: squats, stairs, walking  Pain is Relieved By: NSAIDs and rest  INTEGUMENTARY (edema, incisions): WNL  POSTURE: WNL  GAIT:  Weightbearing Status: WBAT  Assistive Device(s): None  Gait Deviations: WNL  BALANCE/PROPRIOCEPTION:   WEIGHTBEARING ALIGNMENT: Knee WB Alignment:Genu varus L, Genu varus  R  NON-WEIGHTBEARING ALIGNMENT:   ROM: AROM WNL  Passive knee flexion on left is painful at end range.    STRENGTH: WNL  FLEXIBILITY:  tight HS's  SPECIAL TESTS:  Negative meniscus, PTF compression, ligament testing  FUNCTIONAL TESTS:  painful DL and SL squat  PALPATION: WNL  JOINT MOBILITY:     Assessment & Plan   CLINICAL IMPRESSIONS  Medical Diagnosis:      Treatment Diagnosis: Left knee pain (OA)   Impression/Assessment: Patient is a 63 year old male with left knee and hip complaints.  The following significant findings have been identified: Pain and Decreased ROM/flexibility. These impairments interfere with their ability to perform work tasks, recreational activities, household chores, and community mobility as compared to previous level of function.     Clinical Decision Making (Complexity):  Clinical Presentation: Stable/Uncomplicated  Clinical Presentation Rationale: based on medical and personal factors listed in PT evaluation  Clinical Decision Making (Complexity): Low complexity    PLAN OF CARE  Treatment Interventions:  Modalities: Cryotherapy  Interventions: Therapeutic Exercise    Long Term Goals     PT Goal 1  Goal Identifier: Stairs  Goal Description: Able to ascend and descend stairs painfree  Rationale: to maximize safety and independence with performance of ADLs and functional tasks  Target Date: 07/24/24      Frequency of Treatment: Every other week  Duration of Treatment: 8 weeks    Recommended Referrals to Other Professionals:   Education Assessment:        Risks and benefits of evaluation/treatment have been explained.   Patient/Family/caregiver agrees with Plan of Care.     Evaluation Time:     PT Eval, Low Complexity Minutes (28388): 15       Signing Clinician: Sharath Lee PT      [facility]                                                                                   OUTPATIENT PHYSICAL THERAPY      PLAN OF TREATMENT FOR OUTPATIENT REHABILITATION    Patient's Last Name, First Name, Ge Etienne YOB: 1960   Provider's Name   [facility]   Medical Record No.  [id number]     Onset Date:    Start of Care Date: 06/12/24     Medical Diagnosis:         PT Treatment Diagnosis:  Left knee pain (OA) Plan of Treatment  Frequency/Duration: Every other week/ 8 weeks    Certification date from 06/12/24 to 08/06/24         See note for plan of treatment details and functional goals     Sharath Lee, PT                         I CERTIFY THE NEED FOR THESE SERVICES FURNISHED UNDER        THIS PLAN OF TREATMENT AND WHILE UNDER MY CARE     (Physician attestation of this document indicates review and certification of the therapy plan).              Referring Provider:  Juvencio Carter    Initial Assessment  See Epic Evaluation- Start of Care Date: 06/12/24

## 2024-06-25 ENCOUNTER — THERAPY VISIT (OUTPATIENT)
Dept: PHYSICAL THERAPY | Facility: CLINIC | Age: 64
End: 2024-06-25
Payer: COMMERCIAL

## 2024-06-25 DIAGNOSIS — M25.562 ACUTE PAIN OF LEFT KNEE: Primary | ICD-10-CM

## 2024-06-25 PROCEDURE — 97110 THERAPEUTIC EXERCISES: CPT | Mod: GP | Performed by: PHYSICAL THERAPIST

## 2024-06-25 PROCEDURE — 97010 HOT OR COLD PACKS THERAPY: CPT | Mod: GP | Performed by: PHYSICAL THERAPIST

## 2024-06-25 NOTE — PROGRESS NOTES
"   06/25/24 0500   Appointment Info   Signing clinician's name / credentials Sharath Lee PT   Total/Authorized Visits 2-4 (ET)   Visits Used 2   PT Tx Diagnosis Left knee pain (OA)   Quick Adds Certification   Progress Note/Certification   Start of Care Date 06/12/24   Therapy Frequency Every other week   Predicted Duration 8 weeks   Certification date from 06/12/24   Certification date to 08/06/24   PT Goal 1   Goal Identifier Stairs   Goal Description Able to ascend and descend stairs painfree   Rationale to maximize safety and independence with performance of ADLs and functional tasks   Target Date 07/24/24   Subjective Report   Subjective Report Already noticing improvement in how the left knee and hip feel.  Compliant with HEP and reduction in walking.   PT Modalities   PT Modalities Cryotherapy   Cryotherapy   Cryotherapy Minutes (66005) 10   Ice -Type Pack   Duration 10 min   Location Left knee   Treatment Interventions (PT)   Interventions Therapeutic Procedure/Exercise   Therapeutic Procedure/Exercise   Therapeutic Procedures: strength, endurance, ROM, flexibility minutes (90048) 40   Therapeutic Procedures Ther Proc 2;Ther Proc 3;Ther Proc 4;Ther Proc 5;Ther Proc 6;Ther Proc 7;Ther Proc 8;Ther Proc 9;Ther Proc 10   Ther Proc 1 Bike (SH=7)   Ther Proc 1 - Details 5 min L1   Ther Proc 2 Knee bends   Ther Proc 2 - Details 2x10   Ther Proc 3 Seated HS curls   Ther Proc 3 - Details 2x15 red   Ther Proc 4 Bridging   Ther Proc 4 - Details 2x10   Ther Proc 5 Supine piriformis str   Ther Proc 5 - Details 3x10\"   Ther Proc 6 Supine figure 4 str   Ther Proc 6 - Details 3x10\"   Ther Proc 7 Standing HS str   Ther Proc 7 - Details 3x10\"   Ther Proc 8 Leg press (SH=4)   Ther Proc 8 - Details 2x15 125#   Ther Proc 9 SL quad stretch   Ther Proc 9 - Details 3x10\"   Ther Proc 10 Standing hip flexor stretch on chair   Ther Proc 10 - Details 3x10\"   Plan   Plan for next session Cont with HEP.  f/u prn.   Total Session Time "   Timed Code Treatment Minutes 40   Total Treatment Time (sum of timed and untimed services) 50         DISCHARGE  Reason for Discharge: Pt is improving and independent with a HEP.    Equipment Issued: none    Discharge Plan: Patient to continue home program.    Referring Provider:  Referred Self

## 2024-10-24 SDOH — HEALTH STABILITY: PHYSICAL HEALTH: ON AVERAGE, HOW MANY MINUTES DO YOU ENGAGE IN EXERCISE AT THIS LEVEL?: 60 MIN

## 2024-10-24 SDOH — HEALTH STABILITY: PHYSICAL HEALTH: ON AVERAGE, HOW MANY DAYS PER WEEK DO YOU ENGAGE IN MODERATE TO STRENUOUS EXERCISE (LIKE A BRISK WALK)?: 4 DAYS

## 2024-10-24 ASSESSMENT — SOCIAL DETERMINANTS OF HEALTH (SDOH): HOW OFTEN DO YOU GET TOGETHER WITH FRIENDS OR RELATIVES?: ONCE A WEEK

## 2024-10-29 ENCOUNTER — OFFICE VISIT (OUTPATIENT)
Dept: INTERNAL MEDICINE | Facility: CLINIC | Age: 64
End: 2024-10-29
Payer: COMMERCIAL

## 2024-10-29 VITALS
DIASTOLIC BLOOD PRESSURE: 82 MMHG | WEIGHT: 196 LBS | OXYGEN SATURATION: 100 % | BODY MASS INDEX: 26.55 KG/M2 | TEMPERATURE: 98 F | HEART RATE: 62 BPM | SYSTOLIC BLOOD PRESSURE: 118 MMHG | HEIGHT: 72 IN | RESPIRATION RATE: 14 BRPM

## 2024-10-29 DIAGNOSIS — Z86.0100 PERSONAL HISTORY OF COLON POLYPS, UNSPECIFIED: ICD-10-CM

## 2024-10-29 DIAGNOSIS — N40.1 BENIGN PROSTATIC HYPERPLASIA WITH URINARY RETENTION: ICD-10-CM

## 2024-10-29 DIAGNOSIS — R33.8 BENIGN PROSTATIC HYPERPLASIA WITH URINARY RETENTION: ICD-10-CM

## 2024-10-29 DIAGNOSIS — Z87.891 PERSONAL HISTORY OF TOBACCO USE: ICD-10-CM

## 2024-10-29 DIAGNOSIS — G47.33 MILD OBSTRUCTIVE SLEEP APNEA: ICD-10-CM

## 2024-10-29 DIAGNOSIS — I77.810 ASCENDING AORTA DILATION (H): ICD-10-CM

## 2024-10-29 DIAGNOSIS — E78.00 HYPERCHOLESTEREMIA: ICD-10-CM

## 2024-10-29 DIAGNOSIS — Z00.00 WELCOME TO MEDICARE PREVENTIVE VISIT: Primary | ICD-10-CM

## 2024-10-29 DIAGNOSIS — Z00.00 HEALTHCARE MAINTENANCE: ICD-10-CM

## 2024-10-29 DIAGNOSIS — K20.0 EOSINOPHILIC ESOPHAGITIS: ICD-10-CM

## 2024-10-29 DIAGNOSIS — Z51.81 ENCOUNTER FOR THERAPEUTIC DRUG MONITORING: ICD-10-CM

## 2024-10-29 DIAGNOSIS — Z12.5 SCREENING FOR PROSTATE CANCER: ICD-10-CM

## 2024-10-29 DIAGNOSIS — M25.562 ACUTE PAIN OF LEFT KNEE: ICD-10-CM

## 2024-10-29 PROBLEM — R13.12 OROPHARYNGEAL DYSPHAGIA: Status: RESOLVED | Noted: 2023-10-23 | Resolved: 2024-10-29

## 2024-10-29 PROBLEM — N40.0 BPH (BENIGN PROSTATIC HYPERPLASIA): Status: RESOLVED | Noted: 2024-01-09 | Resolved: 2024-10-29

## 2024-10-29 LAB
ALBUMIN SERPL BCG-MCNC: 4 G/DL (ref 3.5–5.2)
ALBUMIN UR-MCNC: NEGATIVE MG/DL
ALP SERPL-CCNC: 82 U/L (ref 40–150)
ALT SERPL W P-5'-P-CCNC: 30 U/L (ref 0–70)
ANION GAP SERPL CALCULATED.3IONS-SCNC: 8 MMOL/L (ref 7–15)
APPEARANCE UR: CLEAR
AST SERPL W P-5'-P-CCNC: 29 U/L (ref 0–45)
BILIRUB SERPL-MCNC: 0.6 MG/DL
BILIRUB UR QL STRIP: NEGATIVE
BUN SERPL-MCNC: 13.4 MG/DL (ref 8–23)
CALCIUM SERPL-MCNC: 8.9 MG/DL (ref 8.8–10.4)
CHLORIDE SERPL-SCNC: 106 MMOL/L (ref 98–107)
CHOLEST SERPL-MCNC: 137 MG/DL
COLOR UR AUTO: YELLOW
CREAT SERPL-MCNC: 0.94 MG/DL (ref 0.67–1.17)
EGFRCR SERPLBLD CKD-EPI 2021: 90 ML/MIN/1.73M2
ERYTHROCYTE [DISTWIDTH] IN BLOOD BY AUTOMATED COUNT: 12.9 % (ref 10–15)
FASTING STATUS PATIENT QL REPORTED: YES
FASTING STATUS PATIENT QL REPORTED: YES
GLUCOSE SERPL-MCNC: 98 MG/DL (ref 70–99)
GLUCOSE UR STRIP-MCNC: NEGATIVE MG/DL
HCO3 SERPL-SCNC: 26 MMOL/L (ref 22–29)
HCT VFR BLD AUTO: 43.1 % (ref 40–53)
HDLC SERPL-MCNC: 39 MG/DL
HGB BLD-MCNC: 14.3 G/DL (ref 13.3–17.7)
HGB UR QL STRIP: NEGATIVE
KETONES UR STRIP-MCNC: NEGATIVE MG/DL
LDLC SERPL CALC-MCNC: 84 MG/DL
LEUKOCYTE ESTERASE UR QL STRIP: NEGATIVE
MCH RBC QN AUTO: 29.1 PG (ref 26.5–33)
MCHC RBC AUTO-ENTMCNC: 33.2 G/DL (ref 31.5–36.5)
MCV RBC AUTO: 88 FL (ref 78–100)
NITRATE UR QL: NEGATIVE
NONHDLC SERPL-MCNC: 98 MG/DL
PH UR STRIP: 5.5 [PH] (ref 5–8)
PLATELET # BLD AUTO: 178 10E3/UL (ref 150–450)
POTASSIUM SERPL-SCNC: 4.1 MMOL/L (ref 3.4–5.3)
PROT SERPL-MCNC: 6.7 G/DL (ref 6.4–8.3)
PSA SERPL DL<=0.01 NG/ML-MCNC: 0.51 NG/ML (ref 0–4.5)
RBC # BLD AUTO: 4.92 10E6/UL (ref 4.4–5.9)
SODIUM SERPL-SCNC: 140 MMOL/L (ref 135–145)
SP GR UR STRIP: <=1.005 (ref 1–1.03)
TRIGL SERPL-MCNC: 71 MG/DL
UROBILINOGEN UR STRIP-ACNC: 0.2 E.U./DL
WBC # BLD AUTO: 4.6 10E3/UL (ref 4–11)

## 2024-10-29 PROCEDURE — 81003 URINALYSIS AUTO W/O SCOPE: CPT | Performed by: INTERNAL MEDICINE

## 2024-10-29 PROCEDURE — 80061 LIPID PANEL: CPT | Performed by: INTERNAL MEDICINE

## 2024-10-29 PROCEDURE — G0103 PSA SCREENING: HCPCS | Performed by: INTERNAL MEDICINE

## 2024-10-29 PROCEDURE — 90662 IIV NO PRSV INCREASED AG IM: CPT | Performed by: INTERNAL MEDICINE

## 2024-10-29 PROCEDURE — 36415 COLL VENOUS BLD VENIPUNCTURE: CPT | Performed by: INTERNAL MEDICINE

## 2024-10-29 PROCEDURE — 99214 OFFICE O/P EST MOD 30 MIN: CPT | Mod: 25 | Performed by: INTERNAL MEDICINE

## 2024-10-29 PROCEDURE — G0008 ADMIN INFLUENZA VIRUS VAC: HCPCS | Performed by: INTERNAL MEDICINE

## 2024-10-29 PROCEDURE — 90480 ADMN SARSCOV2 VAC 1/ONLY CMP: CPT | Performed by: INTERNAL MEDICINE

## 2024-10-29 PROCEDURE — G0402 INITIAL PREVENTIVE EXAM: HCPCS | Performed by: INTERNAL MEDICINE

## 2024-10-29 PROCEDURE — 91320 SARSCV2 VAC 30MCG TRS-SUC IM: CPT | Performed by: INTERNAL MEDICINE

## 2024-10-29 PROCEDURE — 85027 COMPLETE CBC AUTOMATED: CPT | Performed by: INTERNAL MEDICINE

## 2024-10-29 PROCEDURE — 80053 COMPREHEN METABOLIC PANEL: CPT | Performed by: INTERNAL MEDICINE

## 2024-10-29 RX ORDER — ROSUVASTATIN CALCIUM 10 MG/1
10 TABLET, COATED ORAL AT BEDTIME
Qty: 90 TABLET | Refills: 3 | Status: SHIPPED | OUTPATIENT
Start: 2024-10-29

## 2024-10-29 RX ORDER — ESOMEPRAZOLE MAGNESIUM 40 MG/1
40 CAPSULE, DELAYED RELEASE ORAL
Qty: 90 CAPSULE | Refills: 3 | Status: SHIPPED | OUTPATIENT
Start: 2024-10-29

## 2024-10-29 RX ORDER — ESOMEPRAZOLE MAGNESIUM 40 MG/1
40 CAPSULE, DELAYED RELEASE ORAL 2 TIMES DAILY
COMMUNITY
Start: 2024-08-20 | End: 2024-10-29

## 2024-10-29 NOTE — PATIENT INSTRUCTIONS
Patient Education   Preventive Care Advice   This is general advice given by our system to help you stay healthy. However, your care team may have specific advice just for you. Please talk to your care team about your preventive care needs.  Nutrition  Eat 5 or more servings of fruits and vegetables each day.  Try wheat bread, brown rice and whole grain pasta (instead of white bread, rice, and pasta).  Get enough calcium and vitamin D. Check the label on foods and aim for 100% of the RDA (recommended daily allowance).  Lifestyle  Exercise at least 150 minutes each week  (30 minutes a day, 5 days a week).  Do muscle strengthening activities 2 days a week. These help control your weight and prevent disease.  No smoking.  Wear sunscreen to prevent skin cancer.  Consider seeing a dermatologist every 1 to 2 years for a total-body skin exam  Have a dental exam and cleaning every 6 months.  Annual eye exam  Yearly exams  See your health care team every year to talk about:  Any changes in your health.  Any medicines your care team has prescribed.  Preventive care, family planning, and ways to prevent chronic diseases.  Shots (vaccines)   .COVID-19 shot: Provided today  Flu shot: Get a flu shot every year.  Provided today  Tetanus shot: Get a tetanus shot every 10 years.  You are due for a tetanus booster.  You can schedule a Td vaccine at your pharmacy  RSV vaccine is recommended this fall.  You should also get Prevnar 20 (pneumonia vaccine) completed  Shingles shot (for age 50 and up).Shingrix is recommended.  This can be scheduled at your pharmacy   General health tests  Diabetes screening:  Cholesterol test: Annually  Hepatitis C: Get tested at least once in your life.  Abdominal aneurysm screening with an ultrasound is recommended at age 65 in all men with a smoking history.  This will be scheduled  Follow-up of dilated thoracic aorta with CTA is being scheduled  Cancer screening tests  Colon cancer screening: It is  important to start screening for colon cancer at age 45.  Colonoscopy should be repeated in 2029  Prostate cancer screening test: Annual PSA  For informational purposes only. Not to replace the advice of your health care provider. Copyright   2023 [employer]. All rights reserved. Clinically reviewed by the [facility] Transitions Program. [employer] 259257 - REV 01/24.  Hearing Loss: Care Instructions  Overview     Hearing loss is a sudden or slow decrease in how well you hear. It can range from slight to profound. Permanent hearing loss can occur with aging. It also can happen when you are exposed long-term to loud noise. Examples include listening to loud music, riding motorcycles, or being around other loud machines.  Hearing loss can affect your work and home life. It can make you feel lonely or depressed. You may feel that you have lost your independence. But hearing aids and other devices can help you hear better and feel connected to others.  Follow-up care is a key part of your treatment and safety. Be sure to make and go to all appointments, and call your doctor if you are having problems. It's also a good idea to know your test results and keep a list of the medicines you take.  How can you care for yourself at home?  Avoid loud noises whenever possible. This helps keep your hearing from getting worse.  Always wear hearing protection around loud noises.  Wear a hearing aid as directed.  A professional can help you pick a hearing aid that will work best for you.  You can also get hearing aids over the counter for mild to moderate hearing loss.  Have hearing tests as your doctor suggests. They can show whether your hearing has changed. Your hearing aid may need to be adjusted.  Use other devices as needed. These may include:  Telephone amplifiers and hearing aids that can connect to a television, stereo, radio, or microphone.  Devices that use lights or vibrations. These alert you to the  "doorbell, a ringing telephone, or a baby monitor.  Television closed-captioning. This shows the words at the bottom of the screen. Most new TVs can do this.  TTY (text telephone). This lets you type messages back and forth on the telephone instead of talking or listening. These devices are also called TDD. When messages are typed on the keyboard, they are sent over the phone line to a receiving TTY. The message is shown on a monitor.  Use text messaging, social media, and email if it is hard for you to communicate by telephone.  Try to learn a listening technique called speechreading. It is not lipreading. You pay attention to people's gestures, expressions, posture, and tone of voice. These clues can help you understand what a person is saying. Face the person you are talking to, and have them face you. Make sure the lighting is good. You need to see the other person's face clearly.  Think about counseling if you need help to adjust to your hearing loss.  When should you call for help?  Watch closely for changes in your health, and be sure to contact your doctor if:    You think your hearing is getting worse.     You have new symptoms, such as dizziness or nausea.   Where can you learn more?  Go to [email or web address].[email or web address]  Enter R798 in the search box to learn more about \"Hearing Loss: Care Instructions.\"  Current as of: September 27, 2023  Content Version: 14.2 2024 [employer].   Care instructions adapted under license by your healthcare professional. If you have questions about a medical condition or this instruction, always ask your healthcare professional. [employer] disclaims any warranty or liability for your use of this information.       "

## 2024-10-29 NOTE — PROGRESS NOTES
Preventive Care Visit  [facility]  Juvencio Carter MD, Internal Medicine  Oct 29, 2024      Assessment & Plan     Welcome to Medicare preventive visit  Immunizations are reviewed and providing flu shot and COVID-vaccine today.  Recommending RSV vaccine and Prevnar 20 this fall.  He should also get Shingrix completed and needs a tetanus booster.  He has a living well.  Non-smoker.  Uses alcohol in moderation.  Regular exercise and good diet habits to maintain a healthy weight discussed.  Up to date with colonoscopies and this should be repeated in 2029.  Prostate exam is deferred but I will check a PSA for prostate cancer screening.  Dementia and depression screening completed.  He is getting an annual eye exam completed.  Seeing a dentist every 6 months recommended.  Skin exam performed and recommending regular use of sunblock.  We discussed seeing a dermatologist every 1 to 2 years.  Hepatitis C antibody for screening was normal.  Will screen for diabetes with fasting glucose.   Obtain ultrasound for AAA screening given previous smoking history.     Eosinophilic esophagitis  He was experiencing esophageal dysphagia and underwent EGD grossly normal in appearance but biopsies suggesting eosinophilic esophagitis.  Started on Nexium 40 mg twice daily with resolution of his dysphagia symptoms.  At this point, I am suggesting that he decrease his dose to 40 mg daily and monitor for any recurrent symptoms.  He will return to twice daily dosing should this occur.  - esomeprazole (NEXIUM) 40 MG DR capsule; Take 1 capsule (40 mg) by mouth every morning (before breakfast).    Ascending aorta dilation (H)  He needs a CTA chest completed to follow-up known dilated ascending thoracic aorta.  Asymptomatic.  Blood pressure under excellent control.  - CTA Chest with Contrast; Future    Hypercholesteremia  Recheck a lipid profile taking rosuvastatin.  Tolerating medication without side effect  -  rosuvastatin (CRESTOR) 10 MG tablet; Take 1 tablet (10 mg) by mouth at bedtime.  - Lipid Profile (Chol, Trig, HDL, LDL calc); Future    Benign prostatic hyperplasia with urinary retention  Underwent TURP earlier this year with good results.  Still having some urinary frequency and urgency.  There may be a component of overactive bladder and he can discuss taking medication such as Detrol or Myrbetriq with urology if this persists.  We discussed cutting back on caffeine  - UA Macroscopic with reflex to Microscopic and Culture - Lab Collect; Future    Mild obstructive sleep apnea  Symptoms are mild and he does not require the use of CPAP    Acute pain of left knee  Improved after physical therapy    Personal history of colon polyps, unspecified  Colonoscopy completed earlier this year with no polyps.  Repeat in 5 years    Personal history of tobacco use  AAA screening given distant history of tobacco use  - [facility] Medicare AAA Screening; Future    Encounter for therapeutic drug monitoring  Monitor LFTs while on statin and renal function while taking PPI  - Comprehensive metabolic panel (BMP + Alb, Alk Phos, ALT, AST, Total. Bili, TP); Future    Screening for prostate cancer    - PSA, screen; Future    Healthcare maintenance    - CBC with platelets; Future    Patient has been advised of split billing requirements and indicates understanding: Yes        BMI  Estimated body mass index is 26.58 kg/m  as calculated from the following:    Height as of this encounter: 1.829 m (6').    Weight as of this encounter: 88.9 kg (196 lb).       Counseling  Appropriate preventive services were addressed with this patient via screening, questionnaire, or discussion as appropriate for fall prevention, nutrition, physical activity, Tobacco-use cessation, social engagement, weight loss and cognition.  Checklist reviewing preventive services available has been given to the patient.  Reviewed patient's diet, addressing concerns and/or  questions.   The patient was provided with written information regarding signs of hearing loss.   Information on urinary incontinence and treatment options given to patient.       Follow-up in 1 year for annual wellness visit    Jenn Pedersen is a 63 year old, presenting for the following: Here for a welcome to Medicare visit and to follow-up medical problems including new diagnosis of eosinophilic esophagitis, hypercholesterolemia and BPH.  See assessment and plan for details  Medicare Visit        10/29/2024     6:59 AM   Additional Questions   Roomed by             Health Care Directive  Patient does have a Health Care Directive: Patient states has Advance Directive and will bring in a copy to clinic.      10/24/2024   General Health   How would you rate your overall physical health? Good   Feel stress (tense, anxious, or unable to sleep) Not at all            10/24/2024   Nutrition   Diet: Regular (no restrictions)            10/24/2024   Exercise   Days per week of moderate/strenous exercise 4 days   Average minutes spent exercising at this level 60 min            10/24/2024   Social Factors   Frequency of gathering with friends or relatives Once a week   Worry food won't last until get money to buy more No    No   Food not last or not have enough money for food? No    No   Do you have housing? (Housing is defined as stable permanent housing and does not include staying ouside in a car, in a tent, in an abandoned building, in an overnight shelter, or couch-surfing.) Yes    Yes   Are you worried about losing your housing? No    No   Lack of transportation? No    No   Unable to get utilities (heat,electricity)? No    No       Multiple values from one day are sorted in reverse-chronological order         10/24/2024   Fall Risk   Fallen 2 or more times in the past year? No    Trouble with walking or balance? No        Patient-reported          10/24/2024   Activities of Daily Living- Home Safety   Needs help  with the following daily activites None of the above   Safety concerns in the home None of the above            10/24/2024   Dental   Dentist two times every year? Yes            10/24/2024   Hearing Screening   Hearing concerns? (!) TROUBLE UNDERSTANDING SOFT OR WHISPERED SPEECH.            10/24/2024   Driving Risk Screening   Patient/family members have concerns about driving No            10/24/2024   General Alertness/Fatigue Screening   Have you been more tired than usual lately? No            10/24/2024   Urinary Incontinence Screening   Bothered by leaking urine in past 6 months Yes            10/24/2024   TB Screening   Were you born outside of the [place]? No            Today's PHQ-2 Score:       10/28/2024     8:42 AM   PHQ-2 ( 1999 [employer])   Q1: Little interest or pleasure in doing things 0    Q2: Feeling down, depressed or hopeless 0    PHQ-2 Score 0    Q1: Little interest or pleasure in doing things Not at all   Q2: Feeling down, depressed or hopeless Not at all   PHQ-2 Score 0       Patient-reported           10/24/2024   Substance Use   Alcohol more than 3/day or more than 7/wk No   Do you have a current opioid prescription? No   How severe/bad is pain from 1 to 10? 1/10   Do you use any other substances recreationally? No        Social History     Tobacco Use    Smoking status: Former     Passive exposure: Past    Smokeless tobacco: Never    Tobacco comments:     quit 23yo   Vaping Use    Vaping status: Never Used   Substance Use Topics    Alcohol use: Yes     Comment: Alcoholic Drinks/day: 3 beer/ week    Drug use: Not Currently       Last PSA:   Prostate Specific Antigen Screen   Date Value Ref Range Status   10/23/2023 4.74 (H) 0.00 - 4.50 ng/mL Final   04/18/2022 3.92 0.00 - 4.50 ug/L Final     ASCVD Risk   The ASCVD Risk score (Jacobo FOSTER, et al., 2019) failed to calculate for the following reasons:    The valid total cholesterol range is 130 to 320 mg/dL            Reviewed and updated as  needed this visit by Provider                    Past Medical History:   Diagnosis Date    Abnormal chest x-ray 01/16/2017    Normal CT scan    Allergic rhinitis     Arthralgia 01/19/2018    Ascending aorta dilation (H)     4.4 cm on CT February 2019, stable Feb 2020, CTA stable 4.3 cm April 2022    BPH (benign prostatic hyperplasia)     Rezum procedure June 2023 unsuccessful.  Needing to self cath despite tamsulosin. TURP 2024    Cervical radiculopathy 01/01/2003    Left C5-C6 foraminal stenosis with bulging disc and osteophyte complex    Change in bowel movement 01/31/2020    Chest pain 01/01/2006    Normal GXT    Eosinophilic esophagitis 10/29/2024    EGD 5/2024 EE on path      Fatigue 11/13/2015    Foreign body in hand, left, subsequent encounter 03/05/2018    Hand surgery 2018    Hypercholesteremia     CT coronary calcium score 102, 50 to 75th percentile.  Recommending starting statin    Mild obstructive sleep apnea 10/23/2023    Oropharyngeal dysphagia 10/23/2023    EE on EGD 5/24    Osteoarthritis     Personal history of colonic polyps     Colonoscopy November 2012 with polyp.  Colonoscopy March 2019 with multiple polyps including advanced adenoma, colonoscopy May 2024 normal.  Repeat in 5 years    Tendinitis, de Quervain's 01/01/2013    Right wrist pain     Past Surgical History:   Procedure Laterality Date    ANKLE FRACTURE SURGERY      APPENDECTOMY      CATARACT EXTRACTION Bilateral     COLONOSCOPY N/A 5/21/2024    Procedure: Colonoscopy;  Surgeon: Ge Matt MD;  Location:  GI    ESOPHAGOSCOPY, GASTROSCOPY, DUODENOSCOPY (EGD), COMBINED N/A 5/21/2024    Procedure: ESOPHAGOGASTRODUODENOSCOPY, WITH BIOPSY;  Surgeon: Ge Matt MD;  Location:  GI    HAND SURGERY  01/01/2018    Removal of large foreign body/splinter    TONSILLECTOMY      TURP  2024     Current providers sharing in care for this patient include:  Patient Care Team:  Juvencio Carter MD as PCP - General  Emma  MD Juvencio as Assigned PCP    The following health maintenance items are reviewed in Epic and correct as of today:  Health Maintenance   Topic Date Due    ZOSTER IMMUNIZATION (1 of 2) Never done    DTAP/TDAP/TD IMMUNIZATION (2 - Td or Tdap) 10/01/2022    Pneumococcal Vaccine: 65+ Years (1 of 1 - PCV) Never done    ADVANCE CARE PLANNING  01/25/2024    INFLUENZA VACCINE (1) 09/01/2024    COVID-19 Vaccine (7 - 2024-25 season) 09/01/2024    LIPID  10/23/2024    ANNUAL REVIEW OF HM ORDERS  10/23/2024    MEDICARE ANNUAL WELLNESS VISIT  10/23/2024    FALL RISK ASSESSMENT  10/29/2025    GLUCOSE  10/23/2026    COLORECTAL CANCER SCREENING  05/21/2029    RSV VACCINE (1 - 1-dose 75+ series) 12/03/2033    HEPATITIS C SCREENING  Completed    PHQ-2 (once per calendar year)  Completed    AORTIC ANEURYSM SCREENING (SYSTEM ASSIGNED)  Completed    HPV IMMUNIZATION  Aged Out    MENINGITIS IMMUNIZATION  Aged Out    RSV MONOCLONAL ANTIBODY  Aged Out    HIV SCREENING  Discontinued    LUNG CANCER SCREENING  Discontinued         Review of Systems  Constitutional, HEENT, cardiovascular, pulmonary, GI, , musculoskeletal, neuro, skin, endocrine and psych systems are negative, except as otherwise noted.     Objective    Exam  /82 (BP Location: Right arm, Patient Position: Sitting, Cuff Size: Adult Regular)   Pulse 62   Temp 98  F (36.7  C) (Oral)   Resp 14   Ht 1.829 m (6')   Wt 88.9 kg (196 lb)   SpO2 100%   BMI 26.58 kg/m     Estimated body mass index is 26.58 kg/m  as calculated from the following:    Height as of this encounter: 1.829 m (6').    Weight as of this encounter: 88.9 kg (196 lb).    Physical Exam  EYES: Eyelids, conjunctiva, and sclera were normal. Pupils were normal. Cornea, iris, and lens were normal bilaterally.  HEAD, EARS, NOSE, MOUTH, AND THROAT: Head and face were normal. TMs and external auditory canals are normal.  Oropharynx normal  NECK: Neck appearance was normal. There were no neck masses and the  thyroid was not enlarged and no nodules are felt.  No lymphadenopathy.  RESPIRATORY: Breathing pattern was normal and the chest moved symmetrically.   Lung sounds were normal and there were no rales or wheezes.  CARDIOVASCULAR: Heart rate and rhythm were normal.  S1 and S2 were normal and there were no extra sounds or murmurs. Peripheral pulses in arms and legs were normal.  There was no peripheral edema.  No carotid bruits.  GASTROINTESTINAL:  Bowel sounds were present.   Palpation detected no tenderness, mass, or enlarged organs.   RECTAL/PROSTATE: Deferred  MUSCULOSKELETAL: Skeletal configuration was normal and muscle mass was normal for age. Joint appearance was overall normal.  LYMPHATIC: There were no enlarged nodes.  SKIN/HAIR/NAILS: Skin color was normal.  There were no concerning skin lesions.  NEUROLOGIC: The patient was alert and oriented to person, place, time, and circumstance. Speech was normal. Cranial nerves were normal. Motor strength was normal for age. The patient was normally coordinated.  Sensation intact.  PSYCHIATRIC:  Mood and affect were normal and the patient had normal recent and remote memory. The patient's judgment and insight were normal.         10/29/2024   Mini Cog   Clock Draw Score 2 Normal   3 Item Recall 3 objects recalled   Mini Cog Total Score 5            Vision Screen  Vision Screen Results: Pass      Signed Electronically by: Juvencio Carter MD

## 2024-11-01 DIAGNOSIS — E78.00 HYPERCHOLESTEREMIA: ICD-10-CM

## 2024-11-01 RX ORDER — ROSUVASTATIN CALCIUM 10 MG/1
10 TABLET, COATED ORAL AT BEDTIME
Qty: 90 TABLET | Refills: 3 | OUTPATIENT
Start: 2024-11-01

## 2024-11-01 NOTE — TELEPHONE ENCOUNTER
This refill request is a duplicate request, previously received or sent.  Sent denial notification to pharmacy.  Viki Shelley RN  [facility]

## 2024-11-20 ENCOUNTER — HOSPITAL ENCOUNTER (OUTPATIENT)
Dept: ULTRASOUND IMAGING | Facility: CLINIC | Age: 64
Discharge: HOME | End: 2024-11-20
Attending: INTERNAL MEDICINE
Payer: COMMERCIAL

## 2024-11-20 ENCOUNTER — HOSPITAL ENCOUNTER (OUTPATIENT)
Dept: CT IMAGING | Facility: CLINIC | Age: 64
Discharge: HOME | End: 2024-11-20
Attending: INTERNAL MEDICINE
Payer: COMMERCIAL

## 2024-11-20 DIAGNOSIS — I72.3 COMMON ILIAC ANEURYSM (H): Primary | ICD-10-CM

## 2024-11-20 DIAGNOSIS — Z87.891 PERSONAL HISTORY OF TOBACCO USE: ICD-10-CM

## 2024-11-20 DIAGNOSIS — I77.810 ASCENDING AORTA DILATION (H): ICD-10-CM

## 2024-11-20 PROCEDURE — 71275 CT ANGIOGRAPHY CHEST: CPT

## 2024-11-20 PROCEDURE — 250N000011 HC RX IP 250 OP 636: Performed by: INTERNAL MEDICINE

## 2024-11-20 PROCEDURE — 76706 US ABDL AORTA SCREEN AAA: CPT

## 2024-11-20 PROCEDURE — 250N000009 HC RX 250: Performed by: INTERNAL MEDICINE

## 2024-11-20 RX ORDER — IOPAMIDOL 755 MG/ML
500 INJECTION, SOLUTION INTRAVASCULAR ONCE
Status: COMPLETED | OUTPATIENT
Start: 2024-11-20 | End: 2024-11-20

## 2024-11-20 RX ADMIN — SODIUM CHLORIDE 60 ML: 9 INJECTION, SOLUTION INTRAVENOUS at 08:43

## 2024-11-20 RX ADMIN — IOPAMIDOL 72 ML: 755 INJECTION, SOLUTION INTRAVENOUS at 08:43

## 2024-11-21 PROBLEM — I77.810 ASCENDING AORTA DILATION (H): Status: RESOLVED | Noted: 2022-05-03 | Resolved: 2024-11-21

## 2025-02-24 ENCOUNTER — TRANSFERRED RECORDS (OUTPATIENT)
Dept: HEALTH INFORMATION MANAGEMENT | Facility: CLINIC | Age: 65
End: 2025-02-24
Payer: COMMERCIAL

## 2025-04-16 ENCOUNTER — TELEPHONE (OUTPATIENT)
Dept: INTERNAL MEDICINE | Facility: CLINIC | Age: 65
End: 2025-04-16

## 2025-04-16 ENCOUNTER — OFFICE VISIT (OUTPATIENT)
Dept: INTERNAL MEDICINE | Facility: CLINIC | Age: 65
End: 2025-04-16
Payer: COMMERCIAL

## 2025-04-16 VITALS
WEIGHT: 201 LBS | RESPIRATION RATE: 14 BRPM | OXYGEN SATURATION: 96 % | BODY MASS INDEX: 27.22 KG/M2 | HEIGHT: 72 IN | HEART RATE: 75 BPM | TEMPERATURE: 97.8 F | SYSTOLIC BLOOD PRESSURE: 122 MMHG | DIASTOLIC BLOOD PRESSURE: 80 MMHG

## 2025-04-16 DIAGNOSIS — J34.89 SINUS PRESSURE: ICD-10-CM

## 2025-04-16 DIAGNOSIS — B97.89 ACUTE VIRAL SINUSITIS: ICD-10-CM

## 2025-04-16 DIAGNOSIS — J01.90 ACUTE VIRAL SINUSITIS: ICD-10-CM

## 2025-04-16 DIAGNOSIS — B97.89 ACUTE VIRAL SINUSITIS: Primary | ICD-10-CM

## 2025-04-16 DIAGNOSIS — J01.90 ACUTE VIRAL SINUSITIS: Primary | ICD-10-CM

## 2025-04-16 RX ORDER — TRIAMCINOLONE ACETONIDE 40 MG/ML
60 INJECTION, SUSPENSION INTRA-ARTICULAR; INTRAMUSCULAR ONCE
Status: COMPLETED | OUTPATIENT
Start: 2025-04-16 | End: 2025-04-16

## 2025-04-16 RX ORDER — BUDESONIDE 0.5 MG/2ML
0.5 INHALANT ORAL DAILY
Qty: 2 ML | Refills: 3 | Status: SHIPPED | OUTPATIENT
Start: 2025-04-16

## 2025-04-16 RX ADMIN — TRIAMCINOLONE ACETONIDE 60 MG: 40 INJECTION, SUSPENSION INTRA-ARTICULAR; INTRAMUSCULAR at 11:08

## 2025-04-16 ASSESSMENT — ENCOUNTER SYMPTOMS: COUGH: 1

## 2025-04-16 NOTE — PROGRESS NOTES
"  Assessment & Plan     Acute viral sinusitis  Sinus pressure  No findings of physical exam to indicate need for antibiotics as symptoms appear consistent with a viral sinusitis.  However due to the amount of sinus pressure he is experiencing we discussed that a Kenalog injection could be helpful to help alleviate some of the swelling he has in his sinuses.  Patient would like to try this.  He was given Kenalog 60 mg IM in clinic and tolerated this well.  We discussed additional symptomatic management at home to include continued saline nasal rinses.  We discussed he can use budesonide with his nasal rinse once daily to also help alleviate his symptoms.  We discussed that as he feels like he gets congested quite a bit throughout the year due to some allergies he can use the budesonide saline solutions on and off as needed.  Reviewed use of these if he decides to use them again in the future.  Also recommend use of Nasacort.  As he does states he occasionally gets some nosebleeds due to dry air and congestion Nasacort would be more beneficial for symptoms with decreased risk of nosebleeds versus Flonase.  Reviewed use of this nasal spray.  Also recommended additional symptomatic management with Tylenol and ibuprofen as needed.  If symptoms are not improving in the next 5 to 7 days he can reach out to me and we can trial an antibiotic at that time to cover for possible secondary bacterial sinusitis.  If it anytime however he develop significant symptoms such as chest pain, shortness of breath or fever I would recommend a reevaluation in clinic or urgent care.  - budesonide (PULMICORT) 0.5 MG/2ML neb solution; Spray 2 mLs (0.5 mg) in nostril daily.  - triamcinolone (KENALOG-40) injection 60 mg          BMI  Estimated body mass index is 27.25 kg/m  as calculated from the following:    Height as of this encounter: 1.829 m (6' 0.01\").    Weight as of this encounter: 91.2 kg (201 lb).     Subjective   Slava is a 64 year old, " "presenting for the following health issues:  Cough (Yellow nasal drainage, headache, fatigue, chills for 5 days- no covid testing)      4/16/2025    10:13 AM   Additional Questions   Roomed by Genesis SIERRA   Accompanied by Wife Jazz     History of Present Illness       Reason for visit:  Been sick for 5 days.  Seems like a severe cold  Symptom onset:  3-7 days ago  Symptoms include:  Severe nasal congestion and chest congestion.  Symptom intensity:  Severe  Symptom progression:  Staying the same  Had these symptoms before:  No  What makes it better:  Hot shower and netti lot helps for a while   He is taking medications regularly.      Symptoms started this past Friday with sinus symptoms and fatigue. No fevers. No vomiting or diarrhea. Has had cough, congestion and PND. Using cold medications and mucinex. Occasional advil. No known ill contract. No recent travel.         ROS  Comprehensive 12-point review of systems was completed and negative except as noted in HPI.        Objective    /80 (BP Location: Right arm, Patient Position: Sitting)   Pulse 75   Temp 97.8  F (36.6  C)   Resp 14   Ht 1.829 m (6' 0.01\")   Wt 91.2 kg (201 lb)   SpO2 96%   BMI 27.25 kg/m    Body mass index is 27.25 kg/m .  Physical Exam   Constitutional: In no acute distress.  Clean appearance.  Does not appear acutely ill.  Ears: TMs without erythema or effusions.  Grossly normal hearing.   Nose: Mild nasal congestion.  No rhinorrhea.  Turbinates noted to be swollen bilaterally and nostrils.  No frontal or maxillary sinus tenderness.  Oropharynx: Mild erythema.  Posterior oropharyngeal cobblestoning noted.  Neck: Supple.  No thyromegaly or lymphadenopathy noted.  Cardiovascular: Regular rate and rhythm.   Respiratory: Normal respiratory effort.  Lung sounds clear throughout on auscultation.  Skin: Skin is pink, warm and dry.  No rashes.   Musculoskeletal: Gait normal.  Able to mount exam table without difficulties.  Psychiatric: " Appropriate affect and demeanor.               Signed Electronically by: Simi Callejas NP

## 2025-04-16 NOTE — PATIENT INSTRUCTIONS
For your symptoms this is consistent with a viral process.  Viral symptoms will typically last around 10 days.  At this time is too early to incorporate antibiotics.  However certain things can help make your symptoms improve so they do not linger or worsen.    Continue with the Hailey Pots.  You want to use this once or twice a day.  You do not need to overdo this.    I did prescribe you budesonide solution that you can add to your Hailey pot and use this once daily.  You can use it for 2 or 3 days at a time or even a week if needed.  You can start these on and off as needed for sinus pressure/discomfort.    You do want to start an allergy nasal spray such as Nasacort.  This should be used once or twice daily I can help decrease the mucus you are making and calm down swelling within your sinuses.  When using these nasal sprays they should be used about 20 to 30 minutes after you rinse with a Netti pot.  To avoid flushing out of the medicine.  When using the nasal spray make sure you are doing gentle breathing to avoid snorting the medication all the way to the back of the throat as this will not provide any benefit to your sinus symptoms if it is inhaled too deeply.    Pressure and pain within your face comes from the swelling of the tissue which is common even in viral illnesses or allergies.    Steam can temporarily help however it does increase swelling so I would recommend using cool mist humidification and avoiding a lot of hot showers or steam.    You can use Tylenol and ibuprofen as needed for any discomfort.    If symptoms are lasting longer than 10 days or at any time you have thick white mucus that may be an indication for need for antibiotics.  Feel free to shoot me a message if symptoms are not improving by next week.

## 2025-04-16 NOTE — TELEPHONE ENCOUNTER
"Incoming fax from pharmacy - budesonide (PULMICORT) 0.5 MG/2ML neb solution - Notes say: \"Pulmicort or Rhinocort?\"  "

## 2025-04-16 NOTE — TELEPHONE ENCOUNTER
Outgoing call to pharmacy to clarify. Looks like Pulmicort was sent today 4/16          Pharmacist wanted to clarify as Pulmicort is typically not to be sprayed via nostrils, but patient's wife able to confirm it is going to be mixed for the wash and is good to go. No action needed at this time.

## 2025-05-20 ENCOUNTER — RESULTS FOLLOW-UP (OUTPATIENT)
Dept: INTERNAL MEDICINE | Facility: CLINIC | Age: 65
End: 2025-05-20

## 2025-05-20 ENCOUNTER — HOSPITAL ENCOUNTER (OUTPATIENT)
Dept: ULTRASOUND IMAGING | Facility: CLINIC | Age: 65
Discharge: HOME | End: 2025-05-20
Attending: INTERNAL MEDICINE
Payer: COMMERCIAL

## 2025-05-20 DIAGNOSIS — I72.3 COMMON ILIAC ANEURYSM: ICD-10-CM

## 2025-05-20 PROCEDURE — 76775 US EXAM ABDO BACK WALL LIM: CPT

## 2025-07-20 ASSESSMENT — SLEEP AND FATIGUE QUESTIONNAIRES
HOW LIKELY ARE YOU TO NOD OFF OR FALL ASLEEP WHILE SITTING AND TALKING TO SOMEONE: WOULD NEVER DOZE
HOW LIKELY ARE YOU TO NOD OFF OR FALL ASLEEP WHILE SITTING AND READING: SLIGHT CHANCE OF DOZING
HOW LIKELY ARE YOU TO NOD OFF OR FALL ASLEEP WHILE SITTING QUIETLY AFTER LUNCH WITHOUT ALCOHOL: SLIGHT CHANCE OF DOZING
HOW LIKELY ARE YOU TO NOD OFF OR FALL ASLEEP WHILE WATCHING TV: SLIGHT CHANCE OF DOZING
HOW LIKELY ARE YOU TO NOD OFF OR FALL ASLEEP WHEN YOU ARE A PASSENGER IN A CAR FOR AN HOUR WITHOUT A BREAK: SLIGHT CHANCE OF DOZING
HOW LIKELY ARE YOU TO NOD OFF OR FALL ASLEEP IN A CAR, WHILE STOPPED FOR A FEW MINUTES IN TRAFFIC: WOULD NEVER DOZE
HOW LIKELY ARE YOU TO NOD OFF OR FALL ASLEEP WHILE LYING DOWN TO REST IN THE AFTERNOON WHEN CIRCUMSTANCES PERMIT: MODERATE CHANCE OF DOZING
HOW LIKELY ARE YOU TO NOD OFF OR FALL ASLEEP WHILE SITTING INACTIVE IN A PUBLIC PLACE: WOULD NEVER DOZE

## 2025-07-24 ENCOUNTER — OFFICE VISIT (OUTPATIENT)
Dept: SLEEP MEDICINE | Facility: CLINIC | Age: 65
End: 2025-07-24
Payer: COMMERCIAL

## 2025-07-24 VITALS
HEIGHT: 72 IN | WEIGHT: 190 LBS | HEART RATE: 61 BPM | SYSTOLIC BLOOD PRESSURE: 137 MMHG | OXYGEN SATURATION: 96 % | BODY MASS INDEX: 25.73 KG/M2 | DIASTOLIC BLOOD PRESSURE: 81 MMHG

## 2025-07-24 DIAGNOSIS — R06.81 WITNESSED APNEIC SPELLS: ICD-10-CM

## 2025-07-24 DIAGNOSIS — R06.83 SNORING: Primary | ICD-10-CM

## 2025-07-24 NOTE — NURSING NOTE
Chief Complaint   Patient presents with    Sleep Problem     Apple Watch test indicated mild sleep apnea, snoring & gasping, inconsistent stretches of fatigue       Initial /81   Pulse 61   Ht 1.829 m (6')   Wt 86.2 kg (190 lb)   SpO2 96%   BMI 25.77 kg/m   Estimated body mass index is 25.77 kg/m  as calculated from the following:    Height as of this encounter: 1.829 m (6').    Weight as of this encounter: 86.2 kg (190 lb).    Medication Reconciliation: complete  ESS: 661  Neck circumference: 16 inches / 40 centimeters.    DME: n/a    Piter Mathis, VF

## 2025-07-24 NOTE — PATIENT INSTRUCTIONS
"        MY TREATMENT INFORMATION FOR SLEEP APNEA-  Ge Kiran    Am I having a home sleep study?  --->Watch the video for the device you are using:    -/drop off device-   https://www.youYub.com/watch?v=yGGFBdELGhk    Frequently asked questions:  1. What is Obstructive Sleep Apnea (EDIS)? EDIS is the most common type of sleep apnea. Apnea means, \"without breath.\"  Apnea is most often caused by narrowing or collapse of the upper airway as muscles relax during sleep.   Almost everyone has occasional apneas. Most people with sleep apnea have had brief interruptions at night frequently for many years.  The severity of sleep apnea is related to how frequent and severe the events are.   2. What are the consequences of EDIS? Symptoms include: feeling sleepy during the day, snoring loudly, gasping or stopping of breathing, trouble sleeping, and occasionally morning headaches or heartburn at night.  Sleepiness can be serious and even increase the risk of falling asleep while driving. Other health consequences may include development of high blood pressure and other cardiovascular disease in persons who are susceptible. Untreated EDIS  can contribute to heart disease, stroke and diabetes.   3. What are the treatment options? In most situations, sleep apnea is a lifelong disease that must be managed with daily therapy. Medications are not effective for sleep apnea and surgery is generally not considered until other therapies have been tried. Your treatment is your choice . Continuous Positive Airway (CPAP) works right away and is the therapy that is effective in nearly everyone. An oral device to hold your jaw forward is usually the next most reliable option. Other options include postioning devices (to keep you off your back), weight loss, and surgery including a tongue pacing device. There is more detail about some of these options below.  4. Are my sleep studies covered by insurance? Although we will request " verification of coverage, we advise you also check in advance of the study to ensure there is coverage.    Important tips for those choosing CPAP and similar devices  REMEMBER-IF YOU RECEIVE A CALL FROM  [phone]-->IT IS TO SETUP A DEVICE  For new devices, sign up for device ZHANNA to monitor your device for your followup visits  We encourage you to utilize the SafetyPay zhanna or website ( https://Danlan.illuminate Solutions/ ) to monitor your therapy progress and share the data with your healthcare team when you discuss your sleep apnea.                                                    Know your equipment:  CPAP is continuous positive airway pressure that prevents obstructive sleep apnea by keeping the throat from collapsing while you are sleeping. In most cases, the device is  smart  and can slowly self-adjusts if your throat collapses and keeps a record every day of how well you are treated-this information is available to you and your care team.  BPAP is bilevel positive airway pressure that keeps your throat open and also assists each breath with a pressure boost to maintain adequate breathing.  Special kinds of BPAP are used in patients who have inadequate breathing from lung or heart disease. In most cases, the device is  smart  and can slowly self-adjusts to assist breathing. Like CPAP, the device keeps a record of how well you are treated.  Your mask is your connection to the device. You get to choose what feels most comfortable and the staff will help to make sure if fits. Here: are some examples of the different masks that are available: Magnetic mask aids may assist with use but there are safety issues that should be addressed when considering with magnets* ( see end of discussion).       Key points to remember on your journey with sleep apnea:  Sleep study.  PAP devices often need to be adjusted during a sleep study to show that they are effective and adjusted right.  Good tips to remember: Try wearing just  the mask during a quiet time during the day so your body adapts to wearing it. A humidifier is recommended for comfort in most cases to prevent drying of your nose and throat. Allergy medication from your provider may help you if you are having nasal congestion.  Getting settled-in. It takes more than one night for most of us to get used to wearing a mask. Try wearing just the mask during a quiet time during the day so your body adapts to wearing it. A humidifier is recommended for comfort in most cases. Our team will work with you carefully on the first day and will be in contact within 4 days and again at 2 and 4 weeks for advice and remote device adjustments. Your therapy is evaluated by the device each day.   Use it every night. The more you are able to sleep naturally for 7-8 hours, the more likely you will have good sleep and to prevent health risks or symptoms from sleep apnea. Even if you use it 4 hours it helps. Occasionally all of us are unable to use a medical therapy, in sleep apnea, it is not dangerous to miss one night.   Communicate. Call our skilled team on the number provided on the first day if your visit for problems that make it difficult to wear the device. Over 2 out of 3 patients can learn to wear the device long-term with help from our team. Remember to call our team or your sleep providers if you are unable to wear the device as we may have other solutions for those who cannot adapt to mask CPAP therapy. It is recommended that you sleep your sleep provider within the first 3 months and yearly after that if you are not having problems.   Use it for your health. We encourage use of CPAP masks during daytime quiet periods to allow your face and brain to adapt to the sensation of CPAP so that it will be a more natural sensation to awaken to at night or during naps. This can be very useful during the first few weeks or months of adapting to CPAP though it does not help medically to wear CPAP  during wakefulness and  should not be used as a strategy just to meet guidelines.  Take care of your equipment. Make sure you clean your mask and tubing using directions every day and that your filter and mask are replaced as recommended or if they are not working.     *Masks with magnets:  Updated Contraindications  Masks with magnetic components are contraindicated for use by patients where they, or anyone in close physical contact while using the mask, have the following:   Active medical implants that interact with magnets (i.e., pacemakers, implantable cardioverter defibrillators (ICD), neurostimulators, cerebrospinal fluid (CSF) shunts, insulin/infusion pumps)   Metallic implants/objects containing ferromagnetic material (i.e., aneurysm clips/flow disruption devices, embolic coils, stents, valves, electrodes, implants to restore hearing or balance with implanted magnets, ocular implants, metallic splinters in the eye)  Updated Warning  Keep the mask magnets at a safe distance of at least 6 inches (150 mm) away from implants or medical devices that may be adversely affected by magnetic interference. This warning applies to you or anyone in close physical contact with your mask. The magnets are in the frame and lower headgear clips, with a magnetic field strength of up to 400mT. When worn, they connect to secure the mask but may inadvertently detach while asleep.  Implants/medical devices, including those listed within contraindications, may be adversely affected if they change function under external magnetic fields or contain ferromagnetic materials that attract/repel to magnetic fields (some metallic implants, e.g., contact lenses with metal, dental implants, metallic cranial plates, screws, vanessa hole covers, and bone substitute devices). Consult your physician and  of your implant / other medical device for information on the potential adverse effects of magnetic fields.    BESIDES CPAP, WHAT  OTHER THERAPIES ARE THERE?    Positioning Device  Positioning devices are generally used when sleep apnea is mild and only occurs on your back.This example shows a pillow that straps around the waist. It may be appropriate for those whose sleep study shows milder sleep apnea that occurs primarily when lying flat on one's back. Preliminary studies have shown benefit but effectiveness at home may need to be verified by a home sleep test. These devices are generally not covered by medical insurance.  Examples of devices that maintain sleeping on the back to prevent snoring and mild sleep apnea.    Belt type body positioner  http://EyeQuant.[employer]/    Electronic reminder  http://nightshifttherapy.com/            Oral Appliance  What is oral appliance therapy?  An oral appliance device fits on your teeth at night like a retainer used after having braces. The device is made by a specialized dentist and requires several visits over 1-2 months before a manufactured device is made to fit your teeth and is adjusted to prevent your sleep apnea. Once an oral device is working properly, snoring should be improved. A home sleep test may be recommended at that time if to determine whether the sleep apnea is adequately treated.       Some things to remember:  -Oral devices are often, but not always, covered by your medical insurance. Be sure to check with your insurance provider.   -If you are referred for oral therapy, you will be given a list of specialized dentists to consider or you may choose to visit the Web site of the American Academy of Dental Sleep Medicine  -Oral devices are less likely to work if you have severe sleep apnea or are extremely overweight.     If your doctor makes a referral for a Mandibular Advancement Device, you can call a certified sleep medicine office to verify your medical insurance will be accepted and for proper care in fabricating and adjusting the device  [facility]  Search  engine: https://mms.aadsm.org/members/directory/search_bootstrap.php?org_id=ADSM&   Certified in Dental Sleep Medicine    Enrique Myers  Degree: DILLAN  [address]  [address]  [place] [address]  Professional Phone: [phone]  Website: [email or web address]    Mir Hutchinson  Degree: DILLAN  [facility]  [address]  [address]  [place] [address]  Professional Phone: [phone]: [phone]    [facility] [place]  [address] [place] [address]  [address]  [place] [address]  Appointments: [phone]  Fax: [phone]    Amy Allison  [facility]  [address]  [place] [address]  Professional Phone: [phone]  Website: [email or web address]      Heri Ordaz   [facility]   Degree: MD DILLAN   [address] [place] [address]  [address]  [place] [address]  Appointments: [phone]    Magdaleno Koehler  Degree: DDS  [address]  [address]  [place] [address]  Professional Phone: [phone]  Fax: [phone]    Bryan Shields  Degree: DILLAN  [facility]  [address]  [address]  [place] [address]  Professional Phone: [phone]  Website: [email or web address]      Jessica Sarmiento  [facility]-you should verify insurance coverage  [address]  [address]  [place] [address]  Professional Phone: [phone]  Website: [email or web address]      Anyi Roper--DOES NOT ACCEPT INSURANCE  Degree: DILLAN--you should verify insurance coverage  [facility]  [address]  [address]  [place] [address]  Professional Phone: [phone]    Jaimie Menard  Degree: DILLAN, PhD  [facility]  [address] [address]   Appointments: [phone]   Fax: [phone]     [facility]in [facility]- [facility]  Degree:  DOMENICS  [address] [place] [address]  Professional Phone: [phone]  Fax: [phone]  Website: [email or web address]      Jreemy Elaine  Degree: DDS  [facility]  [address] [place] [address]    Mariona Mulet Pradera  Degree: DOMENICS MS  [facility]  [address] [place] [address]  Professional Phone: [phone]      Dawn Bueno  Degree: DDS, MS  [facility]  [address]  [address]  [place] [address]  Professional Phone: [phone]    Salnoi Sifuentes  Degree: DOMENICS  [facility]  [address] [place] [address]  Appointments: [phone]    Tyrone Carcamo  Degree: DILLAN  [facility]  [address] [place] [address]  Professional Phone: [phone]  Website: [email or web address]      Kelechi Herron  Degree: DOMENICS  [facility]  [address] [place] [address]  Professional Phone: [phone]  Fax: [phone]      Brent Connell  Degree: DOMENICS  [facility]  [address]  [address]  [place] [address]    Abdifatah Keita   Degree: DDS   [facility]   [address]   [address]  [place] [address]  Phone [phone]  Website: [email or web address]    Lora Choi   Degree: DDS   [address]  [address]  [place] [address]  Appointments: [phone]    Luci Young   Degree: DDS   [address] [place] [address]   Appointments: [phone]    Jimi Rush   Degree: DDS   [address] [place] [address]   Appointments: [phone]    Nilesh Henry   Degree: DDS   [address] [place] [address]   Appointments: [phone]    Talat Ward   Degree: DDS   [facility]   [address] [place] [address]   Appointments: [phone]    Chrissie Garza   Degree: DILLAN  [address] [place] [address]  Appointments:  [phone]    Andrew Carrell   [address] [place] [address]  Appointments: [phone]    Alka Stacy   Degree: DDS   [facility]   [address] [place] [address]   Appointments: [phone]    Nicolas Sorto   Degree: DILLAN   [address] [place] [address]   Appointments: [phone]    [facility]    Degree: DILLAN  [address]  [address]  [place] [address]  Appointments: [phone]    Hakeem Harmon   Degree: DDS   [facility]   [address]  [address]  [place] [address]  Appointment: [phone]    Sonia Girl   Degree: MS AMY, CHANCE   [facility]  [address]   [address]  [place] [address]  Appointments: [phone]    Heri Simpson   Degree: DDS   [address] [place] [address]   Appointments: [phone]   Fax: [phone]        ACCEPT MEDICARE  Andrew Dubon DDS  [address] [facility] [address], [place] [address]  [phone]; [phone] (fax)  [employer]    Masood Curran DDS, MS   [facility] [address]   [address]   [place] [address]   Appointments: [phone]   Fax: [phone]     Marcial Naik   Degree: WOODY, MSD   [facility]   [address]  [address]  [place] [address]  Appointments: [phone]  Fax: [phone]    Lizzie Dan BDS, MS(CR). MS (OFP)  Diplomate American Board of Orofacial Pain  [facility]  [address],   [place] [address]  Appointment: [phone]  Fax: [phone]        ADDITIONAL PROVIDERS    Christiano Diana DDS    [facility]   Dental and Oral Surgery [facility]  [address] [address]  Appointments: [phone]     [facility]  [address] [facility] [address]  [place] [address]  Appointments: [phone]  Fax- [phone]    Sasha Ervin   Degree:  DDS   [facility]    [address]  [address]  [place] [address]  Appointments: [phone]        More detailed information  An oral appliance is a small acrylic device that fits over the upper and lower teeth  (similar to a retainer or a mouth guard). This device slightly moves jaw forward, which moves the base of the tongue forward, opens the airway, improves breathing for effective treat snoring and obstructive sleep apnea in perhaps 7 out of 10 people .  The best working devices are custom-made by a dental device  after a mold is made of the teeth 1, 2, 3.  When is an oral appliance indicated?  Oral appliance therapy is recommended as a first-line treatment for patients with primary snoring, mild sleep apnea, and for patients with moderate sleep apnea who prefer appliance therapy to use of CPAP4, 5. Severity of sleep apnea is determined by sleep testing and is based on the number of respiratory events per hour of sleep.   How successful is oral appliance therapy?  The success rate of oral appliance therapy in patients with mild sleep apnea is 75-80% while in patients with moderate sleep apnea it is 50-70%. The chance of success in patients with severe sleep apnea is 40-50%. The research also shows that oral appliances have a beneficial effect on the cardiovascular health of EDIS patients at the same magnitude as CPAP therapy7.  Oral appliances should be a second-line treatment in cases of severe sleep apnea, but if not completely successful then a combination therapy utilizing CPAP plus oral appliance therapy may be effective. Oral appliances tend to be effective in a broad range of patients although studies show that the patients who have the highest success are females, younger patients, those with milder disease, and less severe obesity. 3, 6.   Finding a dentist that practices dental sleep medicine  Specific training is available through the American Academy of Dental  Sleep Medicine for dentists interested in working in the field of sleep. To find a dentist who is educated in the field of sleep and the use of oral appliances, near you, visit the Web site of the American Academy of Dental Sleep Medicine.    References  1. Nicki et al. Objectively measured vs self-reported compliance during oral appliance therapy for sleep-disordered breathing. Chest 2013; 144(5): 6211-3896.  2. Alin et al. Objective measurement of compliance during oral appliance therapy for sleep-disordered breathing. Thorax 2013; 68(1): 91-96.  3. Dennise et al. Mandibular advancement devices in 620 men and women with EDIS and snoring: tolerability and predictors of treatment success. Chest 2004; 125: 1892-5969.  4. Iva et al. Oral appliances for snoring and EDIS: a review. Sleep 2006; 29: 244-262.  5. Linnette et al. Oral appliance treatment for EDIS: an update. J Clin Sleep Med 2014; 10(2): 215-227.  6. Nawaf et al. Predictors of OSAH treatment outcome. J Dent Res 2007; 86: 8058-1773.      Weight Loss:   Your Body mass index is 25.77 kg/m .    Being overweight does not necessarily mean you will have health consequences.  Those who have BMI over 30 or over 27 with existing medical conditions carries greater risk.   Weight loss decreases severity of sleep apnea in most people with obesity. For those with mild obesity who have developed snoring with weight gain, even 15-30 pound weight loss can improve and occasionally milder eliminate sleep apnea.  Structured and life-long dietary and health habits are necessary to lose weight and keep healthier weight levels.     The Comprehensive Weight loss program offers all aspects of weight loss strategies including two Non-Surgical Weight Loss Programs: Medical Weight Management and our 24 Week Healthy Lifestyle Program:  Medical Weight Management: You will meet with a Medical Weight Management Provider, as well as a Registered Dietician. The  program may include medication therapy, dietary education, recommended exercise and physical therapy programs, monthly support group meetings, and possible psychological counseling. Follow up visits with the provider or dietician are scheduled based on your progress and needs.  24 Week Healthy Lifestyle Program: This unique program is designed to give you the support of weekly appointments and activities thru a 24-week period. It may include all of the components of the basic program (above), with the addition of 11 individual Health  Visits, 24-week access to the [employer] website for over 700 online classes, and monthly support group meetings. This program has an out-of-pocket expense of $499 to cover the items that can not be billed to insurance (health coaches and T[employer]rating access), and is non-refundable/non-transferable (you may be able to use a Health Savings Account; ask your [facility] provider). There may be an optional meal replacement plan prescribed as well.   Medication therapy has been approved for the treatment of sleep apnea: The FDA approved tirzepatide (ZEPBOUND) for moderate to severe sleep apnea (apnea-hypopnea index greater than or equal to 15) in patients with BMI of greater than or equal to 30, or BMI greater than or equal to 27 with at least 1 weight-related condition such as hypertension or dyslipidemia.  Surgical management achieves meaningful long-term weight loss and improvement in health risks in most patients with more severe obesity.      Sleep Apnea Surgery:    Surgery for obstructive sleep apnea is considered generally only when other therapies fail to work. Surgery may be discussed with you if you are having a difficult time tolerating CPAP and or when there is an abnormal structure that requires surgical correction.  Nose and throat surgeries often enlarge the airway to prevent collapse.  Most of these surgeries create pain for 1-2 weeks and up to half of the most common surgeries  are not effective throughout life.  You should carefully discuss the benefits and drawbacks to surgery with your sleep provider and surgeon to determine if it is the best solution for you.   More information  Surgery for EDIS is directed at areas that are responsible for narrowing or complete obstruction of the airway during sleep.  There are a wide range of procedures available to enlarge and/or stabilize the airway to prevent blockage of breathing in the three major areas where it can occur: the palate, tongue, and nasal regions.  Successful surgical treatment depends on the accurate identification of the factors responsible for obstructive sleep apnea in each person.  A personalized approach is required because there is no single treatment that works well for everyone.  Because of anatomic variation, consultation with an examination by a sleep surgeon is a critical first step in determining what surgical options are best for each patient.  In some cases, examination during sedation may be recommended in order to guide the selection of procedures.  Patients will be counseled about risks and benefits as well as the typical recovery course after surgery. Surgery is typically not a cure for a person s EDIS.  However, surgery will often significantly improve one s EDIS severity (termed  success rate ).  Even in the absence of a cure, surgery will decrease the cardiovascular risk associated with OSA7; improve overall quality of life8 (sleepiness, functionality, sleep quality, etc).      Palate Procedures:  Patients with EDIS often have narrowing of their airway in the region of their tonsils and uvula.  The goals of palate procedures are to widen the airway in this region as well as to help the tissues resist collapse.  Modern palate procedure techniques focus on tissue conservation and soft tissue rearrangement, rather than tissue removal.  Often the uvula is preserved in this procedure. Residual sleep apnea is common in  patient after pharyngoplasty with an average reduction in sleep apnea events of 33%2.      Tongue Procedures:  ExamWhile patients are awake, the muscles that surround the throat are active and keep this region open for breathing. These muscles relax during sleep, allowing the tongue and other structures to collapse and block breathing.  There are several different tongue procedures available.  Selection of a tongue base procedure depends on characteristics seen on physical exam.  Generally, procedures are aimed at removing bulky tissues in this area or preventing the back of the tongue from falling back during sleep.  Success rates for tongue surgery range from 50-62%3.    Hypoglossal Nerve Stimulation:  Hypoglossal nerve stimulation has recently received approval from the [place] Food and Drug Administration for the treatment of obstructive sleep apnea.  This is based on research showing that the system was safe and effective in treating sleep apnea6.  Results showed that the median AHI score decreased 68%, from 29.3 to 9.0. This therapy uses an implant system that senses breathing patterns and delivers mild stimulation to airway muscles, which keeps the airway open during sleep.  The system consists of three fully implanted components: a small generator (similar in size to a pacemaker), a breathing sensor, and a stimulation lead.  Using a small handheld remote, a patient turns the therapy on before bed and off upon awakening.    Candidates for this device must be greater than 18 years of age, have moderate to severe obstructive sleep apnea with less than 25% central events  (AHI between 15-65), BMI less than 35, have tried CPAP/oral appliance for at least 8 weeks without success, and have appropriate upper airway anatomy (determined by a sleep endoscopy performed by Dr. Koby Duckworth or Dr. Madan White).     Nasal Procedures:  Nasal obstruction can interfere with nasal breathing during the day and night.   Studies have shown that relief of nasal obstruction can improve the ability of some patients to tolerate positive airway pressure therapy for obstructive sleep apnea1.  Treatment options include medications such as nasal saline, topical corticosteroid and antihistamine sprays, and oral medications such as antihistamines or decongestants. Non-surgical treatments can include external nasal dilators for selected patients. If these are not successful by themselves, surgery can improve the nasal airway either alone or in combination with these other options.        Combination Procedures:  Combination of surgical procedures and other treatments may be recommended, particularly if patients have more than one area of narrowing or persistent positional disease.  The success rate of combination surgery ranges from 66-80%2,3.    References  Tom VINSON. The Role of the Nose in Snoring and Obstructive Sleep Apnoea: An Update.  Eur Arch Otorhinolaryngol. 2011; 268: 1365-73.   Regulo SM; Sabiha JA; Chuck JR; Pallanch JF; Emely MB; Izzy SG; Destinee PETIT. Surgical modifications of the upper airway for obstructive sleep apnea in adults: a systematic review and meta-analysis. SLEEP 2010;33(10):0068-2112. Khushbu GOYAL. Hypopharyngeal surgery in obstructive sleep apnea: an evidence-based medicine review.  Arch Otolaryngol Head Neck Surg. 2006 Feb;132(2):206-13.  Rufus YH1, Demario Y, Rob ARASH. The efficacy of anatomically based multilevel surgery for obstructive sleep apnea. Otolaryngol Head Neck Surg. 2003 Oct;129(4):327-35.  Khushbu GOYAL, Goldberg A. Hypopharyngeal Surgery in Obstructive Sleep Apnea: An Evidence-Based Medicine Review. Arch Otolaryngol Head Neck Surg. 2006 Feb;132(2):206-13.  Bob PJ et al. Upper-Airway Stimulation for Obstructive Sleep Apnea.  N Engl J Med. 2014 Jan 9;370(2):139-49.  Shellie Y et al. Increased Incidence of Cardiovascular Disease in Middle-aged Men with Obstructive Sleep Apnea. Am J Respir Crit Care Med;  2002 166: 159-165  Drew EM et al. Studying Life Effects and Effectiveness of Palatopharyngoplasty (SLEEP) study: Subjective Outcomes of Isolated Uvulopalatopharyngoplasty. Otolaryngol Head Neck Surg. 2011; 144: 623-631.        WHAT IF I ONLY HAVE SNORING?    Mandibular advancement devices, lateral sleep positioning, long-term weight loss and treatment of nasal allergies have been shown to improve snoring.  Exercising tongue muscles with a game (https://[employer]/zhanna/Startups-reduce-snoring/gs5557278362) or stimulating the tongue during the day with a device (https://doi.org/10.3390/ehi57568866) have improved snoring in some individuals.  https://www.[employer]/  https://www.sleepfoundation.org/best-anti-snoring-mouthpieces-and-mouthguards    Remember to Drive Safe... Drive Alive     Sleep health profoundly affects your health, mood, and your safety.  Thirty three percent of the population (one in three of us) is not getting enough sleep and many have a sleep disorder. Not getting enough sleep or having an untreated / undertreated sleep condition may make us sleepy without even knowing it. In fact, our driving could be dramatically impaired due to our sleep health. As your provider, here are some things I would like you to know about driving:     Here are some warning signs for impairment and dangerous drowsy driving:              -Having been awake more than 16 hours               -Looking tired               -Eyelid drooping              -Head nodding (it could be too late at this point)              -Driving for more than 30 minutes     Some things you could do to make the driving safer if you are experiencing some drowsiness:              -Stop driving and rest              -Call for transportation              -Make sure your sleep disorder is adequately treated     Some things that have been shown NOT to work when experiencing drowsiness while driving:              -Turning on the radio               -Opening windows              -Eating any  distracting  /  entertaining  foods (e.g., sunflower seeds, candy, or any other)              -Talking on the phone      Your decision may not only impact your life, but also the life of others. Please, remember to drive safe for yourself and all of us.

## 2025-07-24 NOTE — PROGRESS NOTES
Outpatient Sleep Medicine Consultation:      Name: Ge Kiran MRN# [id number]   Age: 64 year old YOB: 1960     Date of Consultation: July 24, 2025  Consultation is requested by: No referring provider defined for this encounter. No ref. provider found  Primary care provider: Juvencio Carter       Reason for Sleep Consult:     Ge Kiran is sent by No ref. provider found for a sleep consultation regarding EDIS.    Patient s Reason for visit  Ge Kiran main reason for visit: (Patient-Rptd) Often tired during the day  Patient states problem(s) started: (Patient-Rptd) Approximately 10 years ago  Ge Kiran's goals for this visit: (Patient-Rptd) To determine the reason for  frequent daytime exhaustion           Assessment and Plan:     1. Snoring (Primary)  2.Witnessed apneic spells    Patient is being evaluated for Obstructive Sleep Apnea (EDIS).  Patient's risk factors for EDIS include: loud disruptive snoring, daytime tiredness (ESS normal at 6), witnessed apneas/gasping per wife, age >50, and male gender. We discussed pathophysiology of EDIS and consequences of untreated moderate to severe sleep apnea such as a higher risk of hypertension, cardiovascular disease, cardiac arrhythmias, and stroke. Patient is interested in treatment and willing to undergo overnight sleep testing. Discussed testing with overnight attended polysomnography versus home sleep apnea testing.  Home sleep testing is appropriate for this patient given lack of comorbidity and has been ordered today. Discussed treatment options in the event EDIS is seen on testing. Appeared open to any (other than surgical consult) if positive and agreed treatment will pend degree of EDIS seen. I will communicate results of testing to him via [employer] message once finalized and potentially place orders for treatment at that time. Will also plan on formal follow-up to discuss results as well. Education materials proved in AVS.   -  "HST-Home Sleep Apnea Test - Noxturnal Returnable; Future           History of Present Illness:     Ge Kiran is a 64-year-old male with allergic rhinitis, EOE, BPH, who presents to clinic today for evaluation of EDIS.    Past Sleep Evaluations: Reports was part of [place] watch study couple years ago that suggested mild EDIS.     Wife who is a retired respiratory therapist has commented on gasping episodes and loud snoring. He is largely unaware but can be tired 2/7 days a week - \"it is not debilitating and if I am active and engaged I don't notice it but once I stop I do notice man I am tired\".     SLEEP-WAKE SCHEDULE:     Work/School Days: Patient goes to school/work: (Patient-Rptd) No   Usually gets into bed at (Patient-Rptd) 10 pm  Takes patient about (Patient-Rptd) 5 minutes to fall asleep  Has trouble falling asleep (Patient-Rptd) 0 nights per week  Wakes up in the middle of the night (Patient-Rptd) 1-3 times.  Wakes up due to (Patient-Rptd) Use the bathroom;Uncertain  He has trouble falling back asleep (Patient-Rptd) 1 times a week.   It usually takes (Patient-Rptd) 5 minutes to get back to sleep  Patient is usually up at (Patient-Rptd) 5:30 to 6 AM  Uses alarm: (Patient-Rptd) No    Weekends/Non-work Days/All Other Days:  Usually gets into bed at (Patient-Rptd) 9:30 - 10 PM   Takes patient about (Patient-Rptd) 5 minutes to fall asleep  Patient is usually up at (Patient-Rptd) 5:30 - 6 AM  Uses alarm: (Patient-Rptd) No    Sleep Need  Patient estimates he gets  (Patient-Rptd) 7 - 8 hours sleep on average   Patient thinks he needs about (Patient-Rptd) 7 hours sleep    Ge Kiran prefers to sleep in this position(s): (Patient-Rptd) Back;Side   Patient states they do the following activities in bed: (Patient-Rptd) Read    Naps  Patient takes a purposeful nap (Patient-Rptd) 1 - 2 times a week and naps are usually (Patient-Rptd) 20 - 30 minutes in duration  He feels better after a nap: (Patient-Rptd) No  He " dozes off unintentionally (Patient-Rptd) 0 days per week  Patient has had a driving accident or near-miss due to sleepiness/drowsiness: (Patient-Rptd) No      SLEEP DISRUPTIONS:    Breathing/Snoring  Patient snores:(Patient-Rptd) Yes  Other people complain about his snoring: (Patient-Rptd) Yes  Patient has been told he stops breathing in his sleep:(Patient-Rptd) No  Gasping/choking arousals:Yes  He has issues with the following: (Patient-Rptd) Morning mouth dryness;Getting up to urinate more than once  Denies morning headache, morning nasal congestion, nocturnal GERD    Movement:  Patient gets pain, discomfort, with an urge to move:  (Patient-Rptd) No  Patient has been told he kicks his legs at night:  (Patient-Rptd) No     Behaviors in Sleep:  Denies sleepwalking, sleep talking, sleep eating, bruxism, dream enactment, recurring nightmares, night terrors, sleep paralysis, hypnagogic/hypnopompic hallucinations, cataplexy      Is there anything else you would like your sleep provider to know: (Patient-Rptd) No    CAFFEINE AND OTHER SUBSTANCES:    Patient consumes caffeinated beverages per day:  (Patient-Rptd) 2  Last caffeine use is usually: (Patient-Rptd) 8 AM  List of any prescribed or over the counter stimulants that patient takes:  None  List of any prescribed or over the counter sleep medication patient takes:  None  List of previous sleep medications that patient has tried:    Patient drinks alcohol to help them sleep: (Patient-Rptd) No  Patient drinks alcohol near bedtime: (Patient-Rptd) No    Family History:  Patient has a family member been diagnosed with a sleep disorder: (Patient-Rptd) No            SCALES:    EPWORTH SLEEPINESS SCALE         7/20/2025     6:57 AM    [place] Sleepiness Scale ( GEE Maddox  [id number]<br>ESS - [place]/English - Final version - 21 Nov 07 - [facility] [place].)   Sitting and reading Slight chance of dozing   Watching TV Slight chance of dozing   Sitting, inactive in a public  place (e.g. a theatre or a meeting) Would never doze   As a passenger in a car for an hour without a break Slight chance of dozing   Lying down to rest in the afternoon when circumstances permit Moderate chance of dozing   Sitting and talking to someone Would never doze   Sitting quietly after a lunch without alcohol Slight chance of dozing   In a car, while stopped for a few minutes in traffic Would never doze   Rawlins Score (MC) 6   Rawlins Score (Sleep) 6        Patient-reported       INSOMNIA SEVERITY INDEX (LESLIE)          7/20/2025     6:41 AM   Insomnia Severity Index (LESLIE)   Difficulty falling asleep 0   Difficulty staying asleep 2   Problems waking up too early 0   How SATISFIED/DISSATISFIED are you with your CURRENT sleep pattern? 3   How NOTICEABLE to others do you think your sleep problem is in terms of impairing the quality of your life? 3   How WORRIED/DISTRESSED are you about your current sleep problem? 2   To what extent do you consider your sleep problem to INTERFERE with your daily functioning (e.g. daytime fatigue, mood, ability to function at work/daily chores, concentration, memory, mood, etc.) CURRENTLY? 3   LESLIE Total Score 13        Patient-reported         Guidelines for Scoring/Interpretation:  Total score categories:  0-7 = No clinically significant insomnia   8-14 = Subthreshold insomnia   15-21 = Clinical insomnia (moderate severity)  22-28 = Clinical insomnia (severe)  Used via courtesy of www.[place].va.gov with permission from Juvencio Julian PhD., [facility]      Allergies:    No Known Allergies    Medications:    Current Outpatient Medications   Medication Sig Dispense Refill    budesonide (PULMICORT) 0.5 MG/2ML neb solution Spray 2 mLs (0.5 mg) in nostril daily. 2 mL 3    esomeprazole (NEXIUM) 40 MG DR capsule Take 1 capsule (40 mg) by mouth every morning (before breakfast). 90 capsule 3    Multiple Vitamin (MULTIVITAMIN ADULT PO) Take by mouth.      rosuvastatin (CRESTOR) 10  MG tablet Take 1 tablet (10 mg) by mouth at bedtime. 90 tablet 3       Problem List:  Patient Active Problem List    Diagnosis Date Noted    Ascending aorta dilation      Priority: Medium     4.4 cm on CT February 2019, stable Feb 2020, CTA stable 4.3 cm April 2022.  CTA November 2024 measuring 4.5 cm.  1 year follow-up recommended      Common iliac aneurysm 11/20/2024     Priority: Medium     Measuring 1.9 cm bilaterally November 2024.  No significant change May 2025.  Recheck in 1 year      Eosinophilic esophagitis 10/29/2024     Priority: Medium     EGD 5/2024 EE on path      BPH (benign prostatic hyperplasia)      Priority: Medium     Rezum procedure June 2023 unsuccessful.  Needing to self cath despite tamsulosin. TURP 2024      Acute pain of left knee 06/12/2024     Priority: Medium    Personal history of colon polyps, unspecified      Priority: Medium     Colonoscopy November 2012 with polyp.  Colonoscopy March 2019 with multiple polyps including advanced adenoma, colonoscopy May 2024 normal.  Repeat in 5 years      Mild obstructive sleep apnea 10/23/2023     Priority: Medium    Hypercholesteremia      Priority: Medium     CT coronary calcium score 102, 50 to 75th percentile.  Recommending   starting statin        Allergic rhinitis      Priority: Medium    Osteoarthritis      Priority: Medium        Past Medical/Surgical History:  Past Medical History:   Diagnosis Date    Abnormal chest x-ray 01/16/2017    Normal CT scan    Allergic rhinitis     Arthralgia 01/19/2018    Ascending aorta dilation     4.4 cm on CT February 2019, stable Feb 2020, CTA stable 4.3 cm April 2022.  CTA November 2024 measuring 4.5 cm.  1 year follow-up recommended    BPH (benign prostatic hyperplasia)     Rezum procedure June 2023 unsuccessful.  Needing to self cath despite tamsulosin. TURP 2024    Cervical radiculopathy 01/01/2003    Left C5-C6 foraminal stenosis with bulging disc and osteophyte complex    Change in bowel movement  01/31/2020    Chest pain 01/01/2006    Normal GXT    Common iliac aneurysm 11/20/2024    Measuring 1.9 cm bilaterally November 2024.  No significant change May 2025.  Recheck in 1 year    Eosinophilic esophagitis 10/29/2024    EGD 5/2024 EE on path      Fatigue 11/13/2015    Foreign body in hand, left, subsequent encounter 03/05/2018    Hand surgery 2018    Hypercholesteremia     CT coronary calcium score 102, 50 to 75th percentile.  Recommending starting statin    Mild obstructive sleep apnea 10/23/2023    Oropharyngeal dysphagia 10/23/2023    EE on EGD 5/24    Osteoarthritis     Personal history of colonic polyps     Colonoscopy November 2012 with polyp.  Colonoscopy March 2019 with multiple polyps including advanced adenoma, colonoscopy May 2024 normal.  Repeat in 5 years    Tendinitis, de Quervain's 01/01/2013    Right wrist pain     Past Surgical History:   Procedure Laterality Date    ANKLE FRACTURE SURGERY      APPENDECTOMY      CATARACT EXTRACTION Bilateral     COLONOSCOPY N/A 5/21/2024    Procedure: Colonoscopy;  Surgeon: Ge Matt MD;  Location:  GI    ESOPHAGOSCOPY, GASTROSCOPY, DUODENOSCOPY (EGD), COMBINED N/A 5/21/2024    Procedure: ESOPHAGOGASTRODUODENOSCOPY, WITH BIOPSY;  Surgeon: Ge Matt MD;  Location:  GI    HAND SURGERY  01/01/2018    Removal of large foreign body/splinter    TONSILLECTOMY      TURP  2024       Social History:  Social History     Socioeconomic History    Marital status:      Spouse name: Not on file    Number of children: Not on file    Years of education: Not on file    Highest education level: Not on file   Occupational History    Not on file   Tobacco Use    Smoking status: Former     Passive exposure: Past    Smokeless tobacco: Never    Tobacco comments:     quit 23yo   Vaping Use    Vaping status: Never Used   Substance and Sexual Activity    Alcohol use: Yes     Comment: Alcoholic Drinks/day: 3 beer/ week    Drug use: Not Currently    Sexual  activity: Not on file   Other Topics Concern    Not on file   Social History Narrative    Construction   Retired. 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     Social Drivers of Health     Financial Resource Strain: Low Risk  (10/24/2024)    Financial Resource Strain     Within the past 12 months, have you or your family members you live with been unable to get utilities (heat, electricity) when it was really needed?: No   Food Insecurity: Low Risk  (10/24/2024)    Food Insecurity     Within the past 12 months, did you worry that your food would run out before you got money to buy more?: No     Within the past 12 months, did the food you bought just not last and you didn t have money to get more?: No   Transportation Needs: Low Risk  (10/24/2024)    Transportation Needs     Within the past 12 months, has lack of transportation kept you from medical appointments, getting your medicines, non-medical meetings or appointments, work, or from getting things that you need?: No   Physical Activity: Sufficiently Active (10/24/2024)    Exercise Vital Sign     Days of Exercise per Week: 4 days     Minutes of Exercise per Session: 60 min   Stress: No Stress Concern Present (10/24/2024)    [employer] - Occupational Stress Questionnaire     Feeling of Stress : Not at all   Social Connections: Unknown (10/24/2024)    Social Connection and Isolation Panel [NHANES]     Frequency of Communication with Friends and Family: Not on file     Frequency of Social Gatherings with Friends and Family: Once a week     Attends Oriental orthodox Services: Not on file     Active Member of Clubs or Organizations: Not on file     Attends Club or Organization Meetings: Not on file     Marital Status: Not on file   Interpersonal Safety: Low Risk  (10/29/2024)    Interpersonal Safety     Do you feel physically and emotionally safe where you currently live?: Yes     Within the past 12 months, have you been hit, slapped, kicked or otherwise  physically hurt by someone?: No     Within the past 12 months, have you been humiliated or emotionally abused in other ways by your partner or ex-partner?: No   Housing Stability: Low Risk  (10/24/2024)    Housing Stability     Do you have housing? : Yes     Are you worried about losing your housing?: No       Family History:  Family History   Problem Relation Age of Onset    Dementia Mother         d 98    Breast Cancer Mother     Diabetes Type 2  Brother     Dementia Brother     Colon Cancer No family hx of        Review of Systems:  In the last TWO WEEKS have you experienced any of the following symptoms?  Fevers: (Patient-Rptd) No  Night Sweats: (Patient-Rptd) No  Weight Gain: (Patient-Rptd) No  Pain at Night: (Patient-Rptd) No  Double Vision: (Patient-Rptd) No  Changes in Vision: (Patient-Rptd) No  Difficulty Breathing through Nose: (Patient-Rptd) No  Sore Throat in Morning: (Patient-Rptd) No  Dry Mouth in the Morning: (Patient-Rptd) Yes  Shortness of Breath Lying Flat: (Patient-Rptd) No  Shortness of Breath With Activity: (Patient-Rptd) No  Awakening with Shortness of Breath: (Patient-Rptd) No  Increased Cough: (Patient-Rptd) No  Heart Racing at Night: (Patient-Rptd) No  Swelling in Feet or Legs: (Patient-Rptd) No  Diarrhea at Night: (Patient-Rptd) No  Heartburn at Night: (Patient-Rptd) No  Urinating More than Once at Night: (Patient-Rptd) Yes  Losing Control of Urine at Night: (Patient-Rptd) No  Joint Pains at Night: (Patient-Rptd) No  Headaches in Morning: (Patient-Rptd) No  Weakness in Arms or Legs: (Patient-Rptd) No  Depressed Mood: (Patient-Rptd) No  Anxiety: (Patient-Rptd) No     Physical Examination:  Vitals: /81   Pulse 61   Ht 1.829 m (6')   Wt 86.2 kg (190 lb)   SpO2 96%   BMI 25.77 kg/m     BMI= Body mass index is 25.77 kg/m .  General appearance: Awake, alert, cooperative. Well groomed. Sitting comfortably in chair. In no apparent distress.  HEENT: Head: Normocephalic, atraumatic. Eyes:  "PERRL. Conjunctiva clear. Sclera normal. Nose: External appearance normal.   Neck: Neck Cir (cm): 40 cm (7/24/2025  9:00 AM)  Skin:   No rashes or significant lesions.   Neurologic: Alert, oriented x3. No focal neurological deficit. Gait normal.   Psychiatric: Mood euthymic. Affect congruent with full range and intensity.           Data: All pertinent previous laboratory data reviewed     Recent Labs   Lab Test 10/29/24  0805 10/23/23  1453    139   POTASSIUM 4.1 4.0   CHLORIDE 106 106   CO2 26 23   ANIONGAP 8 10   GLC 98 97   BUN 13.4 8.3   CR 0.94 0.88   HORACE 8.9 9.0       Recent Labs   Lab Test 10/29/24  0805   WBC 4.6   RBC 4.92   HGB 14.3   HCT 43.1   MCV 88   MCH 29.1   MCHC 33.2   RDW 12.9          Recent Labs   Lab Test 10/29/24  0805   PROTTOTAL 6.7   ALBUMIN 4.0   BILITOTAL 0.6   ALKPHOS 82   AST 29   ALT 30       No results found for: \"TSH\"    No results found for: \"UAMP\", \"UBARB\", \"BENZODIAZEUR\", \"UCANN\", \"UCOC\", \"OPIT\", \"UPCP\"    No results found for: \"IRONSAT\", \"LE28497\", \"TIMMY\"    No results found for: \"PH\", \"PHARTERIAL\", \"PO2\", \"JC7INUTDRPB\", \"SAT\", \"PCO2\", \"HCO3\", \"BASEEXCESS\", \"KAYLAH\", \"BEB\"    @LABRCNTIPR(phv:4,pco2v:4,po2v:4,hco3v:4,lolly:4,o2per:4)@    Echocardiology: No results found for this or any previous visit (from the past 4320 hours).    Chest x-ray: No results found for this or any previous visit from the past 365 days.      Chest CT: No results found for this or any previous visit from the past 365 days.      PFT: Most Recent [facility] Pulmonary Function Testing    No results found for: \"20001\"  No results found for: \"20002\"  No results found for: \"20003\"  No results found for: \"20015\"  No results found for: \"20016\"  No results found for: \"20027\"  No results found for: \"20028\"  No results found for: \"20029\"  No results found for: \"20079\"  No results found for: \"20080\"  No results found for: \"20081\"  No results found for: \"20335\"  No results found for: \"20105\"  No results found for: " "\"20053\"  No results found for: \"20054\"  No results found for: \"20055\"      Mini Morales PA-C 7/24/2025     [facility] [place]  [address] [address]     [facility]  [address], [place] [address]    Chart documentation was completed, in part, with [employer] voice-recognition software. Even though reviewed, some grammatical, spelling, and word errors may remain.    36 minutes spent on day of encounter reviewing medical records, history and physical examination, review of previous testing and interpretation, documentation and further activities as noted above        "

## 2025-08-25 ENCOUNTER — OFFICE VISIT (OUTPATIENT)
Dept: ORTHOPEDICS | Facility: CLINIC | Age: 65
End: 2025-08-25
Attending: PHYSICIAN ASSISTANT
Payer: COMMERCIAL

## 2025-08-25 ENCOUNTER — ANCILLARY PROCEDURE (OUTPATIENT)
Dept: GENERAL RADIOLOGY | Facility: CLINIC | Age: 65
End: 2025-08-25
Attending: PHYSICIAN ASSISTANT
Payer: COMMERCIAL

## 2025-08-25 VITALS — HEIGHT: 72 IN | BODY MASS INDEX: 25.73 KG/M2 | WEIGHT: 190 LBS

## 2025-08-25 DIAGNOSIS — M79.641 HAND PAIN, RIGHT: ICD-10-CM

## 2025-08-25 DIAGNOSIS — M79.5 FOREIGN BODY (FB) IN SOFT TISSUE: ICD-10-CM

## 2025-08-25 PROCEDURE — 73130 X-RAY EXAM OF HAND: CPT | Mod: RT | Performed by: STUDENT IN AN ORGANIZED HEALTH CARE EDUCATION/TRAINING PROGRAM

## 2025-08-28 LAB
BACTERIA ABSC ANAEROBE+AEROBE CULT: ABNORMAL
BACTERIA ABSC ANAEROBE+AEROBE CULT: NORMAL
BACTERIA ABSC ANAEROBE+AEROBE CULT: NORMAL
GRAM STAIN RESULT: ABNORMAL
GRAM STAIN RESULT: ABNORMAL

## 2025-09-01 LAB — BACTERIA ABSC ANAEROBE+AEROBE CULT: NORMAL

## 2025-09-04 LAB — BACTERIA ABSC ANAEROBE+AEROBE CULT: NORMAL

## (undated) DEVICE — ENDO BITE BLOCK ADULT OLYMPUS LATEX FREE MAJ-1632

## (undated) DEVICE — ENDO FORCEP ENDOJAW BIOPSY 2.8MMX230CM FB-220U

## (undated) DEVICE — KIT ENDO TURNOVER/PROCEDURE W/CLEAN A SCOPE LINERS 103888

## (undated) RX ORDER — FENTANYL CITRATE 50 UG/ML
INJECTION, SOLUTION INTRAMUSCULAR; INTRAVENOUS
Status: DISPENSED
Start: 2024-05-21

## (undated) RX ORDER — LIDOCAINE HYDROCHLORIDE 10 MG/ML
INJECTION, SOLUTION INFILTRATION; PERINEURAL
Status: DISPENSED
Start: 2025-08-25